# Patient Record
Sex: MALE | Race: BLACK OR AFRICAN AMERICAN | NOT HISPANIC OR LATINO | Employment: OTHER | URBAN - METROPOLITAN AREA
[De-identification: names, ages, dates, MRNs, and addresses within clinical notes are randomized per-mention and may not be internally consistent; named-entity substitution may affect disease eponyms.]

---

## 2017-02-02 ENCOUNTER — HOSPITAL ENCOUNTER (EMERGENCY)
Facility: HOSPITAL | Age: 67
Discharge: HOME | End: 2017-02-02
Attending: EMERGENCY MEDICINE
Payer: OTHER GOVERNMENT

## 2017-02-02 VITALS
TEMPERATURE: 99 F | DIASTOLIC BLOOD PRESSURE: 75 MMHG | HEART RATE: 91 BPM | WEIGHT: 164 LBS | HEIGHT: 71 IN | RESPIRATION RATE: 18 BRPM | SYSTOLIC BLOOD PRESSURE: 160 MMHG | BODY MASS INDEX: 22.96 KG/M2 | OXYGEN SATURATION: 98 %

## 2017-02-02 DIAGNOSIS — R06.02 SHORTNESS OF BREATH: Primary | ICD-10-CM

## 2017-02-02 DIAGNOSIS — I50.22 CHRONIC SYSTOLIC CONGESTIVE HEART FAILURE: ICD-10-CM

## 2017-02-02 DIAGNOSIS — I10 ESSENTIAL HYPERTENSION: ICD-10-CM

## 2017-02-02 DIAGNOSIS — J44.1 COPD EXACERBATION: ICD-10-CM

## 2017-02-02 LAB
ALBUMIN SERPL BCP-MCNC: 4.2 G/DL
ALLENS TEST: ABNORMAL
ALP SERPL-CCNC: 77 U/L
ALT SERPL W/O P-5'-P-CCNC: 25 U/L
ANION GAP SERPL CALC-SCNC: 14 MMOL/L
AST SERPL-CCNC: 38 U/L
BASOPHILS # BLD AUTO: 0.01 K/UL
BASOPHILS NFR BLD: 0.2 %
BILIRUB SERPL-MCNC: 0.5 MG/DL
BNP SERPL-MCNC: 2066 PG/ML
BUN SERPL-MCNC: 15 MG/DL
CALCIUM SERPL-MCNC: 9.5 MG/DL
CHLORIDE SERPL-SCNC: 103 MMOL/L
CK MB SERPL-MCNC: 2.9 NG/ML
CK MB SERPL-RTO: 1.1 %
CK SERPL-CCNC: 274 U/L
CK SERPL-CCNC: 274 U/L
CO2 SERPL-SCNC: 27 MMOL/L
CREAT SERPL-MCNC: 1.1 MG/DL
DELSYS: ABNORMAL
DIFFERENTIAL METHOD: ABNORMAL
EOSINOPHIL # BLD AUTO: 0.1 K/UL
EOSINOPHIL NFR BLD: 1.5 %
ERYTHROCYTE [DISTWIDTH] IN BLOOD BY AUTOMATED COUNT: 14.6 %
EST. GFR  (AFRICAN AMERICAN): >60 ML/MIN/1.73 M^2
EST. GFR  (NON AFRICAN AMERICAN): >60 ML/MIN/1.73 M^2
GLUCOSE SERPL-MCNC: 137 MG/DL
HCO3 UR-SCNC: 29.3 MMOL/L (ref 24–28)
HCT VFR BLD AUTO: 39.9 %
HGB BLD-MCNC: 12.7 G/DL
LYMPHOCYTES # BLD AUTO: 1.7 K/UL
LYMPHOCYTES NFR BLD: 37.8 %
MCH RBC QN AUTO: 28 PG
MCHC RBC AUTO-ENTMCNC: 31.8 %
MCV RBC AUTO: 88 FL
MODE: ABNORMAL
MONOCYTES # BLD AUTO: 0.2 K/UL
MONOCYTES NFR BLD: 4.8 %
NEUTROPHILS # BLD AUTO: 2.6 K/UL
NEUTROPHILS NFR BLD: 55.7 %
PCO2 BLDA: 43.3 MMHG (ref 35–45)
PH SMN: 7.44 [PH] (ref 7.35–7.45)
PLATELET # BLD AUTO: 285 K/UL
PMV BLD AUTO: 11.1 FL
PO2 BLDA: 64 MMHG (ref 80–100)
POC BE: 5 MMOL/L
POC SATURATED O2: 93 % (ref 95–100)
POTASSIUM SERPL-SCNC: 3.8 MMOL/L
PROT SERPL-MCNC: 8.6 G/DL
RBC # BLD AUTO: 4.54 M/UL
SAMPLE: ABNORMAL
SITE: ABNORMAL
SODIUM SERPL-SCNC: 144 MMOL/L
TROPONIN I SERPL DL<=0.01 NG/ML-MCNC: 0.03 NG/ML
WBC # BLD AUTO: 4.6 K/UL

## 2017-02-02 PROCEDURE — 82803 BLOOD GASES ANY COMBINATION: CPT | Performed by: GENERAL PRACTICE

## 2017-02-02 PROCEDURE — 93010 ELECTROCARDIOGRAM REPORT: CPT | Mod: ,,, | Performed by: INTERNAL MEDICINE

## 2017-02-02 PROCEDURE — 94761 N-INVAS EAR/PLS OXIMETRY MLT: CPT | Performed by: GENERAL PRACTICE

## 2017-02-02 PROCEDURE — 99900031 HC PATIENT EDUCATION (STAT): Performed by: GENERAL PRACTICE

## 2017-02-02 PROCEDURE — 94640 AIRWAY INHALATION TREATMENT: CPT | Performed by: GENERAL PRACTICE

## 2017-02-02 PROCEDURE — 99900035 HC TECH TIME PER 15 MIN (STAT): Performed by: GENERAL PRACTICE

## 2017-02-02 PROCEDURE — 82553 CREATINE MB FRACTION: CPT

## 2017-02-02 PROCEDURE — 99284 EMERGENCY DEPT VISIT MOD MDM: CPT | Mod: 25

## 2017-02-02 PROCEDURE — 80053 COMPREHEN METABOLIC PANEL: CPT

## 2017-02-02 PROCEDURE — 83880 ASSAY OF NATRIURETIC PEPTIDE: CPT

## 2017-02-02 PROCEDURE — 36600 WITHDRAWAL OF ARTERIAL BLOOD: CPT | Performed by: GENERAL PRACTICE

## 2017-02-02 PROCEDURE — 84484 ASSAY OF TROPONIN QUANT: CPT

## 2017-02-02 PROCEDURE — 93005 ELECTROCARDIOGRAM TRACING: CPT

## 2017-02-02 PROCEDURE — 63600175 PHARM REV CODE 636 W HCPCS: Performed by: EMERGENCY MEDICINE

## 2017-02-02 PROCEDURE — 96374 THER/PROPH/DIAG INJ IV PUSH: CPT

## 2017-02-02 PROCEDURE — 85025 COMPLETE CBC W/AUTO DIFF WBC: CPT

## 2017-02-02 PROCEDURE — 93005 ELECTROCARDIOGRAM TRACING: CPT | Performed by: GENERAL PRACTICE

## 2017-02-02 PROCEDURE — 27000221 HC OXYGEN, UP TO 24 HOURS: Performed by: GENERAL PRACTICE

## 2017-02-02 PROCEDURE — 99900029 HC O2 SETUP (STAT): Performed by: GENERAL PRACTICE

## 2017-02-02 PROCEDURE — 25000242 PHARM REV CODE 250 ALT 637 W/ HCPCS: Performed by: EMERGENCY MEDICINE

## 2017-02-02 RX ORDER — METHYLPREDNISOLONE 4 MG/1
TABLET ORAL
Qty: 1 PACKAGE | Refills: 0 | Status: SHIPPED | OUTPATIENT
Start: 2017-02-02

## 2017-02-02 RX ORDER — METHYLPREDNISOLONE SOD SUCC 125 MG
125 VIAL (EA) INJECTION
Status: COMPLETED | OUTPATIENT
Start: 2017-02-02 | End: 2017-02-02

## 2017-02-02 RX ORDER — IPRATROPIUM BROMIDE AND ALBUTEROL SULFATE 2.5; .5 MG/3ML; MG/3ML
3 SOLUTION RESPIRATORY (INHALATION)
Status: COMPLETED | OUTPATIENT
Start: 2017-02-02 | End: 2017-02-02

## 2017-02-02 RX ADMIN — METHYLPREDNISOLONE SODIUM SUCCINATE 125 MG: 125 INJECTION, POWDER, FOR SOLUTION INTRAMUSCULAR; INTRAVENOUS at 12:02

## 2017-02-02 RX ADMIN — IPRATROPIUM BROMIDE AND ALBUTEROL SULFATE 3 ML: .5; 3 SOLUTION RESPIRATORY (INHALATION) at 12:02

## 2017-02-02 NOTE — ED NOTES
Pt resting on stretcher. Denies any needs @ present time. Call bell in reach.  Encouraged to call for any needs and/or assistance. Verbalizes understanding. Bed in lowest position and locked.

## 2017-02-02 NOTE — ED PROVIDER NOTES
Encounter Date: 2/2/2017       History     Chief Complaint   Patient presents with    Shortness of Breath     Hx of CHF. States began getting short of breath when lying down starting last night. No respiratory distress noted.      Review of patient's allergies indicates:  No Known Allergies  HPI Comments: He has a history of CHF, coronary disease, and COPD.  [facility]  Reports good compliance with medications, home nebulizers, and home inhalers.  Wife confirms that he is compliant.  Not on home oxygen.  Has had similar episodes in the past, has not required admission.  Feels short of breath with mild orthopnea, mild dyspnea on exertion, and mild cough for the last day or two.  No fever, chills, sweats, sputum production, chest pain, lower extremity edema, pleurisy, hemoptysis, or other complaints.    The history is provided by the patient and the spouse. No  was used.     Past Medical History   Diagnosis Date    Benign prostatic hypertrophy     CHF (congestive heart failure)     COPD (chronic obstructive pulmonary disease)     Coronary artery disease     Depression     Hyperlipidemia     Hypertension      No past medical history pertinent negatives.  Past Surgical History   Procedure Laterality Date    Cardiac surgery      Carotid artery surgery       History reviewed. No pertinent family history.  Social History   Substance Use Topics    Smoking status: Former Smoker    Smokeless tobacco: Never Used    Alcohol use No     Review of Systems   Constitutional: Negative for chills and fever.   HENT: Negative for congestion, facial swelling, nosebleeds and sinus pressure.    Eyes: Negative for pain and redness.   Respiratory: Positive for shortness of breath and wheezing. Negative for chest tightness.    Cardiovascular: Negative for chest pain, palpitations and leg swelling.   Gastrointestinal: Negative for abdominal distention, abdominal pain, diarrhea, nausea and vomiting.    Endocrine: Negative for cold intolerance, polydipsia and polyphagia.   Genitourinary: Negative for difficulty urinating, dysuria, frequency and hematuria.   Musculoskeletal: Negative for arthralgias, back pain, myalgias and neck pain.   Skin: Negative for color change and rash.   Neurological: Negative for dizziness, weakness, numbness and headaches.   Hematological: Negative for adenopathy. Does not bruise/bleed easily.   Psychiatric/Behavioral: Negative for agitation and behavioral problems.   All other systems reviewed and are negative.      Physical Exam   Initial Vitals   BP Pulse Resp Temp SpO2   02/02/17 1114 02/02/17 1114 02/02/17 1114 02/02/17 1114 02/02/17 1114   185/81 93 20 98.6 °F (37 °C) 96 %     Physical Exam    Nursing note and vitals reviewed.  Constitutional: He appears well-developed and well-nourished. He is not diaphoretic. No distress.   HENT:   Head: Normocephalic and atraumatic.   Mouth/Throat: Oropharynx is clear and moist. No oropharyngeal exudate.   Eyes: Conjunctivae and EOM are normal. Pupils are equal, round, and reactive to light. Right eye exhibits no discharge. Left eye exhibits no discharge. No scleral icterus.   Neck: Normal range of motion. Neck supple. No thyromegaly present. No tracheal deviation present. No JVD present.   Cardiovascular: Normal rate, regular rhythm and normal heart sounds. Exam reveals no gallop and no friction rub.    No murmur heard.  Pulmonary/Chest: No respiratory distress. He has wheezes. He has no rhonchi. He has no rales. He exhibits no tenderness.   Moderate diffuse expiratory wheezing with mildly prolonged expiratory phase.  No rales   Abdominal: Soft. Bowel sounds are normal. He exhibits no distension and no mass. There is no tenderness. There is no rebound and no guarding.   Musculoskeletal: Normal range of motion. He exhibits no edema or tenderness.   Lymphadenopathy:     He has no cervical adenopathy.   Neurological: He is alert and oriented to  person, place, and time. He has normal strength. No cranial nerve deficit.   Skin: Skin is warm and dry. No rash noted. No erythema.   Psychiatric: He has a normal mood and affect. His behavior is normal. Judgment and thought content normal.         ED Course   Procedures  Labs Reviewed   CBC W/ AUTO DIFFERENTIAL - Abnormal; Notable for the following:        Result Value    RBC 4.54 (*)     Hemoglobin 12.7 (*)     Hematocrit 39.9 (*)     MCHC 31.8 (*)     RDW 14.6 (*)     Mono # 0.2 (*)     All other components within normal limits   COMPREHENSIVE METABOLIC PANEL - Abnormal; Notable for the following:     Glucose 137 (*)     Total Protein 8.6 (*)     All other components within normal limits   B-TYPE NATRIURETIC PEPTIDE - Abnormal; Notable for the following:     BNP 2066 (*)     All other components within normal limits   TROPONIN I - Abnormal; Notable for the following:     Troponin I 0.031 (*)     All other components within normal limits   CK - Abnormal; Notable for the following:      (*)     All other components within normal limits   CK-MB - Abnormal; Notable for the following:      (*)     All other components within normal limits   ISTAT PROCEDURE - Abnormal; Notable for the following:     POC PO2 64 (*)     POC HCO3 29.3 (*)     POC SATURATED O2 93 (*)     All other components within normal limits     EKG Readings: (Independently Interpreted)   Initial Reading: No STEMI. Rhythm: Normal Sinus Rhythm. Heart Rate: 94.   LVH with repolarization changes; old IMI; long QT       Imaging Results         X-Ray Chest PA And Lateral (Final result) Result time:  02/02/17 13:38:41    Final result by Cheo Power MD (02/02/17 13:38:41)    Impression:         No acute cardiopulmonary disease      Electronically signed by: CHEO POWER MD  Date:     02/02/17  Time:    13:38     Narrative:    Exam: Two-view chest xray    History:    Asthma    Findings:     The heart is normal and the lungs are clear.  Aortic  atherosclerosis.  Sternotomy wires.  Healing right rib fracture.                   Medical Decision Making:   History:   I obtained history from: someone other than patient.  Clinical Tests:   Lab Tests: Ordered and Reviewed  Radiological Study: Ordered and Reviewed  Medical Tests: Ordered and Reviewed    1:44 PM Feels better. Moderate elevation of BP - reports just received instructions from primary care to increase Lasix and will be doing so. Has all other meds/ supplies at home. Decreased wheezing. Feels ready to go home. Current elevations of BNP/ trop are similar to known baseline. Stable for d/c home and continued outpatient rx. Counseled patient and wife in detail.                   ED Course     Clinical Impression:     1. Shortness of breath    2. COPD exacerbation    3. Chronic systolic congestive heart failure    4. Essential hypertension                Fortunato Garcia MD  02/02/17 7339

## 2017-02-02 NOTE — ED AVS SNAPSHOT
[facility]  [address] [address] [address]               Giovanni Mcintosh   2017 11:16 AM   ED    Description:  Male : 1950   Department:  [facility]           Your Care was Coordinated By:     Provider Role From To    Fortunato Garcia MD Attending Provider 17 1103 --      Reason for Visit     Shortness of Breath           Diagnoses this Visit        Comments    Shortness of breath    -  Primary     COPD exacerbation         Chronic systolic congestive heart failure         Essential hypertension           ED Disposition     ED Disposition Condition Comment    Discharge             To Do List           Follow-up Information     Schedule an appointment as soon as possible for a visit with Cande Robbins MD.    Specialty:  Internal Medicine    Contact information:    [address]  [place] [id number] [phone]          Follow up with [facility].    Specialty:  Emergency Medicine    Why:  As needed, If symptoms worsen - As discussed - tests are stable; increase your [facility] instructions; take the steroid pack as labeled; return if worse.    Contact information:    [address] [address] [address] [phone]       These Medications        Disp Refills Start End    methylPREDNISolone (MEDROL DOSEPACK) 4 mg tablet 1 Package 0 2017     Take as labeled      [facility]     [facility] Care [facility] -  Assistance  Registered nurses in the [facility] provide clinical advisement, health education, appointment booking, and other advisory services.  Call for this free service at [phone].             Medications           Message regarding Medications     Verify the changes and/or additions to your medication regime listed below are the same as discussed with your clinician today.  If any of these changes or additions are incorrect, please  notify your healthcare provider.        START taking these NEW medications        Refills    methylPREDNISolone (MEDROL DOSEPACK) 4 mg tablet 0    Sig: Take as labeled    Class: Print      These medications were administered today        Dose Freq    albuterol-ipratropium 2.5mg-0.5mg/3mL nebulizer solution 3 mL 3 mL Every 5 min    Sig: Take 3 mLs by nebulization every 5 (five) minutes.    Class: Normal    Route: Nebulization    methylPREDNISolone sodium succinate injection 125 mg 125 mg ED 1 Time    Sig: Inject 125 mg into the vein ED 1 Time.    Class: Normal    Route: Intravenous           Verify that the below list of medications is an accurate representation of the medications you are currently taking.  If none reported, the list may be blank. If incorrect, please contact your healthcare provider. Carry this list with you in case of emergency.           Current Medications     albuterol (PROAIR HFA) 90 mcg/actuation inhaler Inhale 2 puffs into the lungs every 4 to 6 hours as needed for Wheezing.    aspirin (ECOTRIN) 325 MG EC tablet Take 325 mg by mouth once daily.    calcium-vitamin D (OSCAL) 250 (625)-125 mg-unit per tablet Take 2 tablets by mouth 2 (two) times daily.     clotrimazole (LOTRIMIN) 1 % cream Apply to affected area 2 times daily    cyproheptadine (PERIACTIN) 4 mg tablet Take 4 mg by mouth 2 (two) times daily.    docusate sodium (COLACE) 100 MG capsule Take 1 capsule (100 mg total) by mouth 2 (two) times daily as needed for Constipation (use while taking pain meds to prevent constipation).    finasteride (PROSCAR) 5 mg tablet Take 5 mg by mouth once daily.    fluoxetine (PROZAC) 20 MG capsule Take 40 mg by mouth once daily.    furosemide (LASIX) 40 MG tablet Take 1 tablet (40 mg total) by mouth once daily.    hydrocodone-acetaminophen 7.5-325mg (NORCO) 7.5-325 mg per tablet Take 1 tablet by mouth every 6 (six) hours as needed for Pain.    ibuprofen (ADVIL,MOTRIN) 600 MG tablet Take 1 tablet (600 mg  "total) by mouth every 6 (six) hours as needed for Pain.    lidocaine (LIDODERM) 5 % Place 1 patch onto the skin every 12 (twelve) hours. Remove & Discard patch within 12 hours or as directed by MD    methylPREDNISolone (MEDROL DOSEPACK) 4 mg tablet Take as labeled    mometasone 220 mcg (30 doses) inhaler Inhale 1 puff into the lungs 2 (two) times daily.    multivitamin with minerals tablet Take 1 tablet by mouth once daily.    tiotropium (SPIRIVA) 18 mcg inhalation capsule Inhale 18 mcg into the lungs once daily.           Clinical Reference Information           Your Vitals Were     BP Pulse Temp Resp Height Weight    163/76 100 98.6 °F (37 °C) (Oral) 20 5' 11" (1.803 m) 74.4 kg (164 lb)    SpO2 BMI             97% 22.87 kg/m2         Allergies as of 2/2/2017     No Known Allergies      Immunizations Administered on Date of Encounter - 2/2/2017     None      ED Micro, Lab, POCT     Start Ordered       Status Ordering Provider    02/02/17 1226 02/02/17 1226  ISTAT PROCEDURE  Once      Final result     02/02/17 1214 02/02/17 1213  CK  STAT      Final result     02/02/17 1214 02/02/17 1213  CK-MB  STAT      Final result     02/02/17 1213 02/02/17 1213  CBC auto differential  STAT      Final result     02/02/17 1213 02/02/17 1213  Comprehensive metabolic panel  STAT      Final result     02/02/17 1213 02/02/17 1213  B-Type natriuretic peptide (BNP)  STAT      Final result     02/02/17 1213 02/02/17 1213  Troponin I  STAT      Final result       ED Imaging Orders     Start Ordered       Status Ordering Provider    02/02/17 1213 02/02/17 1213  X-Ray Chest PA And Lateral  1 time imaging      Final result         Discharge Instructions         Discharge Instructions: COPD  You have been diagnosed with chronic obstructive pulmonary disease (COPD). This is a name given to a group of diseases that limit the flow of air in and out of your lungs. This makes it harder to breathe. With COPD, you are also more likely to get lung " infections. COPD includes chronic bronchitis and emphysema. COPD is most often caused by heavy, long-term cigarette smoking.  Home care  Quit smoking  · If you smoke, quit. It is the best thing you can do for your COPD and your overall health.  · Join a stop-smoking program. There are even telephone, text message, and Internet programs to help you quit.  · Ask your healthcare provider about medicines or other methods to help you quit.  · Ask family members to quit smoking as well.  · Don't allow people to smoke in your home, in your car, or when they are around you.  Protect yourself from infection  · Wash your hands often. Do your best to keep your hands away from your face. Most germs are spread from your hands to your mouth.  · Get a flu shot every year. Also ask your provider about pneumonia vaccines.  · Avoid crowds. It's especially important to do this in the winter when more people have colds and flu.  · To stay healthy, get enough sleep, exercise regularly, and eat a balanced diet. You should:  ¨ Get about 8 hours of sleep every night.  ¨ Try to exercise for at least 30 minutes on most days.  ¨ Have healthy foods including fruits and vegetables, 100% whole grains, lean meats and fish, and low-fat dairy products. Try to stay away from foods high in fats and sugar.  Take your medicines  Take your medicines exactly as directed. Don't skip doses.  Manage your stress  Stress can make COPD worse. Use this stress management technique:  · Find a quiet place and sit or lie in a comfortable position.  · Close your eyes and perform breathing exercises for several minutes. Ask your provider about the best way to breathe.  Pulmonary rehabilitation  · Pulmonary rehab can help you feel better. These programs include exercise, breathing techniques, information about COPD, counseling, and help for smokers.  · Ask your provider or your local hospital about programs in your area.  When to call your healthcare provider  Call  your provider immediately if you have any of the following:  · Shortness of breath, wheezing, or coughing  · Increased mucus  · Yellow, green, bloody, or smelly mucus  · Fever or chills  · Tightness in your chest that does not go away with rest or medicine  · An irregular heartbeat or a feeling that your heart is beating very fast  · Swollen ankles   © 3548-6065 [employer]. [address] [place] [address], [place] [address]. All rights reserved. This information is not intended as a substitute for professional medical care. Always follow your healthcare professional's instructions.          Discharge References/Attachments     HYPERTENSION, ESTABLISHED (ENGLISH)    CONGESTIVE HEART FAILURE, LEFT-SIDED (ENGLISH)      [facility] Sign-Up     Activating your [facility] account is as easy as 1-2-3!     1) Visit [email or web address], select Sign Up Now, enter this activation code and your date of birth, then select Next.  [email or web address]  Expires: 3/19/2017  1:48 PM      2) Create a username and password to use when you visit [facility] in the future and select a security question in case you lose your password and select Next.    3) Enter your e-mail address and click Sign Up!    Additional Information  If you have questions, please e-mail [email or web address] or call [phone] to talk to our [facility] staff. Remember, [facility] is NOT to be used for urgent needs. For medical emergencies, dial 911.         Smoking Cessation     If you would like to quit smoking:   You may be eligible for free services if you are a [place] resident and started smoking cigarettes before September 1, 1988.  Call the Smoking Cessation Trust (SCT) toll free at (566) 132-3799 or (719) 739-7996.   Call 2-800-QUIT-NOW if you do not meet the above criteria.             [email or web address] complies with applicable Federal civil rights laws and does not discriminate on the basis of race, color, national origin, age, disability, or  sex.        Language Assistance Services     ATTENTION: Language assistance services are available, free of charge. Please call [phone].      ATENCIÓN: Si habla español, tiene a garcia disposición servicios gratuitos de asistencia lingüística. Llame al [phone].     CHÚ Ý: N?u b?n nói Ti?ng Vi?t, có các d?ch v? h? tr? ngôn ng? mi?n phí dành cho b?n. G?i s? [phone].

## 2017-02-02 NOTE — DISCHARGE INSTRUCTIONS
Discharge Instructions: COPD  You have been diagnosed with chronic obstructive pulmonary disease (COPD). This is a name given to a group of diseases that limit the flow of air in and out of your lungs. This makes it harder to breathe. With COPD, you are also more likely to get lung infections. COPD includes chronic bronchitis and emphysema. COPD is most often caused by heavy, long-term cigarette smoking.  Home care  Quit smoking  · If you smoke, quit. It is the best thing you can do for your COPD and your overall health.  · Join a stop-smoking program. There are even telephone, text message, and Internet programs to help you quit.  · Ask your healthcare provider about medicines or other methods to help you quit.  · Ask family members to quit smoking as well.  · Don't allow people to smoke in your home, in your car, or when they are around you.  Protect yourself from infection  · Wash your hands often. Do your best to keep your hands away from your face. Most germs are spread from your hands to your mouth.  · Get a flu shot every year. Also ask your provider about pneumonia vaccines.  · Avoid crowds. It's especially important to do this in the winter when more people have colds and flu.  · To stay healthy, get enough sleep, exercise regularly, and eat a balanced diet. You should:  ¨ Get about 8 hours of sleep every night.  ¨ Try to exercise for at least 30 minutes on most days.  ¨ Have healthy foods including fruits and vegetables, 100% whole grains, lean meats and fish, and low-fat dairy products. Try to stay away from foods high in fats and sugar.  Take your medicines  Take your medicines exactly as directed. Don't skip doses.  Manage your stress  Stress can make COPD worse. Use this stress management technique:  · Find a quiet place and sit or lie in a comfortable position.  · Close your eyes and perform breathing exercises for several minutes. Ask your provider about the best way to breathe.  Pulmonary  rehabilitation  · Pulmonary rehab can help you feel better. These programs include exercise, breathing techniques, information about COPD, counseling, and help for smokers.  · Ask your provider or your local hospital about programs in your area.  When to call your healthcare provider  Call your provider immediately if you have any of the following:  · Shortness of breath, wheezing, or coughing  · Increased mucus  · Yellow, green, bloody, or smelly mucus  · Fever or chills  · Tightness in your chest that does not go away with rest or medicine  · An irregular heartbeat or a feeling that your heart is beating very fast  · Swollen ankles   © 9982-2250 [employer]. [address] [place] [address], [place] [address]. All rights reserved. This information is not intended as a substitute for professional medical care. Always follow your healthcare professional's instructions.

## 2017-02-02 NOTE — ED NOTES
Hx of CHF. States began getting short of breath when lying down starting last night. No respiratory distress noted.    Respiratory: Respirations even and unlabored. Breath sounds noted with mild expiratory wheezing in upper and lower right and left lung fields. Chest rise symmetrical.   Cardiac: Denies any chest pain.   Neuro: Alert. Oriented to person, place, time, and situation. Answering questions appropriately. PERRLA. Speech clear.   HEENT: No complaints.   Gastro: Abdomen soft. Denies any nausea, vomiting, or diarrhea. Bowel sounds active.   Genitourinary: Voids without any difficulty.   OB/GYN: N/A  Musculoskeletal: Ambulatory without any difficulty. Full active ROM.   Skin: Warm and dry. No open wounds, cuts, or scrapes.   Peripheral Vascular: Radial and pedal pulses strong, regular, and palpable.   Psychosocial: WNL    Pt informed of plan of care. Verbalizes understanding. Denies any questions. Denies any complaints or concerns at this time. Bed in lowest position and locked. Call bell in reach.

## 2017-02-24 ENCOUNTER — HOSPITAL ENCOUNTER (EMERGENCY)
Facility: HOSPITAL | Age: 67
Discharge: HOME | End: 2017-02-24
Attending: EMERGENCY MEDICINE
Payer: OTHER GOVERNMENT

## 2017-02-24 VITALS
DIASTOLIC BLOOD PRESSURE: 89 MMHG | HEART RATE: 99 BPM | TEMPERATURE: 99 F | HEIGHT: 71 IN | SYSTOLIC BLOOD PRESSURE: 177 MMHG | WEIGHT: 164 LBS | BODY MASS INDEX: 22.96 KG/M2 | RESPIRATION RATE: 20 BRPM | OXYGEN SATURATION: 96 %

## 2017-02-24 DIAGNOSIS — J44.1 COPD WITH EXACERBATION: Primary | ICD-10-CM

## 2017-02-24 DIAGNOSIS — I50.23 ACUTE ON CHRONIC SYSTOLIC CONGESTIVE HEART FAILURE: ICD-10-CM

## 2017-02-24 LAB
ALBUMIN SERPL BCP-MCNC: 3.7 G/DL
ALP SERPL-CCNC: 60 U/L
ALT SERPL W/O P-5'-P-CCNC: 24 U/L
ANION GAP SERPL CALC-SCNC: 11 MMOL/L
AST SERPL-CCNC: 32 U/L
BASOPHILS # BLD AUTO: 0.01 K/UL
BASOPHILS NFR BLD: 0.3 %
BILIRUB SERPL-MCNC: 0.6 MG/DL
BNP SERPL-MCNC: 1806 PG/ML
BUN SERPL-MCNC: 12 MG/DL
CALCIUM SERPL-MCNC: 9.3 MG/DL
CHLORIDE SERPL-SCNC: 106 MMOL/L
CO2 SERPL-SCNC: 24 MMOL/L
CREAT SERPL-MCNC: 0.9 MG/DL
DIFFERENTIAL METHOD: ABNORMAL
EOSINOPHIL # BLD AUTO: 0.1 K/UL
EOSINOPHIL NFR BLD: 3.4 %
ERYTHROCYTE [DISTWIDTH] IN BLOOD BY AUTOMATED COUNT: 15.2 %
EST. GFR  (AFRICAN AMERICAN): >60 ML/MIN/1.73 M^2
EST. GFR  (NON AFRICAN AMERICAN): >60 ML/MIN/1.73 M^2
GLUCOSE SERPL-MCNC: 104 MG/DL
HCT VFR BLD AUTO: 27.8 %
HCT VFR BLD AUTO: 38.4 %
HGB BLD-MCNC: 11.9 G/DL
HGB BLD-MCNC: 8.4 G/DL
LYMPHOCYTES # BLD AUTO: 1.7 K/UL
LYMPHOCYTES NFR BLD: 43.1 %
MCH RBC QN AUTO: 27.5 PG
MCHC RBC AUTO-ENTMCNC: 30.2 %
MCV RBC AUTO: 91 FL
MONOCYTES # BLD AUTO: 0.3 K/UL
MONOCYTES NFR BLD: 7.6 %
NEUTROPHILS # BLD AUTO: 1.8 K/UL
NEUTROPHILS NFR BLD: 45.6 %
OB PNL STL: NEGATIVE
PLATELET # BLD AUTO: 136 K/UL
PMV BLD AUTO: 11.1 FL
POTASSIUM SERPL-SCNC: 4 MMOL/L
PROT SERPL-MCNC: 7.4 G/DL
RBC # BLD AUTO: 3.06 M/UL
SODIUM SERPL-SCNC: 141 MMOL/L
TROPONIN I SERPL DL<=0.01 NG/ML-MCNC: 0.05 NG/ML
WBC # BLD AUTO: 3.83 K/UL

## 2017-02-24 PROCEDURE — 83880 ASSAY OF NATRIURETIC PEPTIDE: CPT

## 2017-02-24 PROCEDURE — 80053 COMPREHEN METABOLIC PANEL: CPT

## 2017-02-24 PROCEDURE — 94640 AIRWAY INHALATION TREATMENT: CPT

## 2017-02-24 PROCEDURE — 99284 EMERGENCY DEPT VISIT MOD MDM: CPT | Mod: 25

## 2017-02-24 PROCEDURE — 85014 HEMATOCRIT: CPT

## 2017-02-24 PROCEDURE — 96374 THER/PROPH/DIAG INJ IV PUSH: CPT

## 2017-02-24 PROCEDURE — 84484 ASSAY OF TROPONIN QUANT: CPT

## 2017-02-24 PROCEDURE — 93005 ELECTROCARDIOGRAM TRACING: CPT

## 2017-02-24 PROCEDURE — 93010 ELECTROCARDIOGRAM REPORT: CPT | Mod: ,,, | Performed by: INTERNAL MEDICINE

## 2017-02-24 PROCEDURE — 82272 OCCULT BLD FECES 1-3 TESTS: CPT

## 2017-02-24 PROCEDURE — 96375 TX/PRO/DX INJ NEW DRUG ADDON: CPT

## 2017-02-24 PROCEDURE — C9113 INJ PANTOPRAZOLE SODIUM, VIA: HCPCS | Performed by: EMERGENCY MEDICINE

## 2017-02-24 PROCEDURE — 63600175 PHARM REV CODE 636 W HCPCS: Performed by: EMERGENCY MEDICINE

## 2017-02-24 PROCEDURE — 25000242 PHARM REV CODE 250 ALT 637 W/ HCPCS: Performed by: EMERGENCY MEDICINE

## 2017-02-24 PROCEDURE — 85025 COMPLETE CBC W/AUTO DIFF WBC: CPT

## 2017-02-24 PROCEDURE — 85018 HEMOGLOBIN: CPT

## 2017-02-24 RX ORDER — FUROSEMIDE 10 MG/ML
40 INJECTION INTRAMUSCULAR; INTRAVENOUS
Status: COMPLETED | OUTPATIENT
Start: 2017-02-24 | End: 2017-02-24

## 2017-02-24 RX ORDER — IPRATROPIUM BROMIDE AND ALBUTEROL SULFATE 2.5; .5 MG/3ML; MG/3ML
3 SOLUTION RESPIRATORY (INHALATION)
Status: COMPLETED | OUTPATIENT
Start: 2017-02-24 | End: 2017-02-24

## 2017-02-24 RX ORDER — PANTOPRAZOLE SODIUM 40 MG/10ML
80 INJECTION, POWDER, LYOPHILIZED, FOR SOLUTION INTRAVENOUS
Status: COMPLETED | OUTPATIENT
Start: 2017-02-24 | End: 2017-02-24

## 2017-02-24 RX ORDER — PREDNISONE 10 MG/1
10 TABLET ORAL DAILY
Qty: 21 TABLET | Refills: 0 | Status: SHIPPED | OUTPATIENT
Start: 2017-02-24 | End: 2017-03-06

## 2017-02-24 RX ORDER — METHYLPREDNISOLONE SOD SUCC 125 MG
125 VIAL (EA) INJECTION
Status: COMPLETED | OUTPATIENT
Start: 2017-02-24 | End: 2017-02-24

## 2017-02-24 RX ADMIN — IPRATROPIUM BROMIDE AND ALBUTEROL SULFATE 3 ML: .5; 3 SOLUTION RESPIRATORY (INHALATION) at 02:02

## 2017-02-24 RX ADMIN — PANTOPRAZOLE SODIUM 80 MG: 40 INJECTION, POWDER, FOR SOLUTION INTRAVENOUS at 04:02

## 2017-02-24 RX ADMIN — METHYLPREDNISOLONE SODIUM SUCCINATE 125 MG: 125 INJECTION, POWDER, FOR SOLUTION INTRAMUSCULAR; INTRAVENOUS at 02:02

## 2017-02-24 RX ADMIN — FUROSEMIDE 40 MG: 10 INJECTION, SOLUTION INTRAMUSCULAR; INTRAVENOUS at 04:02

## 2017-02-24 NOTE — ED PROVIDER NOTES
Encounter Date: 2/24/2017       History     Chief Complaint   Patient presents with    Shortness of Breath     pt with sob for 3 days cough since today and mild headache today. hx copd     Review of patient's allergies indicates:  No Known Allergies  HPI Comments: Known to me from recent ER evaluation.  History of CHF, COPD, others as outlined.  Reports good compliance with his medications, but is apparently out of one of his inhalers or nebulizers - exactly which one is not clear.  Steroid course early this month.  Here with his usual COPD, shortness of breath with some cough and mild headache increase for the last 2 or 3 days.  No sputum production, fever, chills, sweats, chest pain, lower extremity edema, or other complaints.    The history is provided by the patient and the spouse. No  was used.     Past Medical History:   Diagnosis Date    Benign prostatic hypertrophy     CHF (congestive heart failure)     COPD (chronic obstructive pulmonary disease)     Coronary artery disease     Depression     Hyperlipidemia     Hypertension      Past Surgical History:   Procedure Laterality Date    CARDIAC SURGERY      carotid artery surgery       History reviewed. No pertinent family history.  Social History   Substance Use Topics    Smoking status: Former Smoker    Smokeless tobacco: Never Used    Alcohol use No     Review of Systems   Constitutional: Negative for chills and fever.   HENT: Negative for congestion, facial swelling, nosebleeds and sinus pressure.    Eyes: Negative for pain and redness.   Respiratory: Positive for cough and shortness of breath. Negative for chest tightness and wheezing.    Cardiovascular: Negative for chest pain, palpitations and leg swelling.   Gastrointestinal: Negative for abdominal distention, abdominal pain, diarrhea, nausea and vomiting.   Endocrine: Negative for cold intolerance, polydipsia and polyphagia.   Genitourinary: Negative for difficulty  urinating, dysuria, frequency and hematuria.   Musculoskeletal: Negative for arthralgias, back pain, myalgias and neck pain.   Skin: Negative for color change and rash.   Neurological: Negative for dizziness, weakness, numbness and headaches.   Hematological: Negative for adenopathy. Does not bruise/bleed easily.   Psychiatric/Behavioral: Negative for agitation and behavioral problems.   All other systems reviewed and are negative.      Physical Exam   Initial Vitals   BP Pulse Resp Temp SpO2   02/24/17 1324 02/24/17 1324 02/24/17 1324 02/24/17 1324 02/24/17 1324   190/91 100 20 99.3 °F (37.4 °C) 96 %     Physical Exam    Nursing note and vitals reviewed.  Constitutional: He appears well-developed and well-nourished. He is not diaphoretic. No distress.   HENT:   Head: Normocephalic and atraumatic.   Mouth/Throat: Oropharynx is clear and moist. No oropharyngeal exudate.   Eyes: Conjunctivae and EOM are normal. Pupils are equal, round, and reactive to light. Right eye exhibits no discharge. Left eye exhibits no discharge. No scleral icterus.   Neck: Normal range of motion. Neck supple. No thyromegaly present. No tracheal deviation present. No JVD present.   Cardiovascular: Normal rate, regular rhythm and normal heart sounds. Exam reveals no gallop and no friction rub.    No murmur heard.  Pulmonary/Chest: No respiratory distress. He has wheezes. He has no rhonchi. He has no rales. He exhibits no tenderness.   Mildly prolonged expiratory phase; mild diffuse wheezing; fairly good air entry.   Abdominal: Soft. Bowel sounds are normal. He exhibits no distension and no mass. There is no tenderness. There is no rebound and no guarding.   Musculoskeletal: Normal range of motion. He exhibits no edema or tenderness.   Lymphadenopathy:     He has no cervical adenopathy.   Neurological: He is alert and oriented to person, place, and time. He has normal strength. No cranial nerve deficit.   Skin: Skin is warm and dry. No rash  noted. No erythema.   Psychiatric: He has a normal mood and affect. His behavior is normal. Judgment and thought content normal.         ED Course   Procedures  Labs Reviewed   CBC W/ AUTO DIFFERENTIAL - Abnormal; Notable for the following:        Result Value    WBC 3.83 (*)     RBC 3.06 (*)     Hemoglobin 8.4 (*)     Hematocrit 27.8 (*)     MCHC 30.2 (*)     RDW 15.2 (*)     Platelets 136 (*)     All other components within normal limits   B-TYPE NATRIURETIC PEPTIDE - Abnormal; Notable for the following:     BNP 1806 (*)     All other components within normal limits   TROPONIN I - Abnormal; Notable for the following:     Troponin I 0.046 (*)     All other components within normal limits   HEMOGLOBIN - Abnormal; Notable for the following:     Hemoglobin 11.9 (*)     All other components within normal limits   HEMATOCRIT - Abnormal; Notable for the following:     Hematocrit 38.4 (*)     All other components within normal limits   COMPREHENSIVE METABOLIC PANEL   OCCULT BLOOD X 1, STOOL     Imaging Results         X-Ray Chest PA And Lateral (Final result) Result time:  02/24/17 15:32:07    Final result by Isaiah Robles MD (02/24/17 15:32:07)    Impression:      Evidence for a healing lower right lateral rib fracture and chronic compression of L2.    Evidence for mild interstitial coarsening at the bases.  This appears new when compared to the prior study concerning for an acute interstitial process such as edema.  There is a small right pleural effusion.    Please correlate clinically..      Electronically signed by: ISAIAH ROBLES MD  Date:     02/24/17  Time:    15:32     Narrative:    EXAM: Chest X-ray PA and Lateral    CLINICAL HISTORY:     COPD    COMPARISON STUDIES:     02/02/2017    FINDINGS:    Cardiomegaly.  Evidence for median sternotomy and CABG.  There is an atherosclerotic aortic arch and tortuous descending thoracic aorta.  There is biapical pleural thickening.  Slightly more prominent on the left.   Stable.  Small right pleural effusion.  Minimal bibasilar interstitial coarsening.  Healing right lower lateral rib fracture.  Chronic compression fracture of L2..            3:45 PM No known history of anemia, GI bleed, ulcer, transfusion, or other gastrointestinal problems.  Drop in H/H noted.  He thinks he may have seen some dark stool, but does not recognize any hematochezia or melena.  On rectal exam just now, soft brown normal appearing stool.  We'll recheck H&H, give empiric Protonix, and check stool for occult blood.  Also, pulmonary edema and pleural effusion noted, will give Lasix.  Resting relatively comfortably.    4:29 PM Stable, breathing easily, no complaints.  Repeat H&H is effectively normal and stool is negative.  Lab error accounts for the previous low blood count readings.  Stable for discharge with increased diuresis, discussed with patient and wife.    EKG Readings: (Independently Interpreted)   Initial Reading: No STEMI. Rhythm: Normal Sinus Rhythm. Heart Rate: 93.   LVH/ lateral TWI; long QT                            ED Course     Clinical Impression:       1. COPD with exacerbation    2. Acute on chronic systolic congestive heart failure                Fortunato Garcia MD  02/24/17 8070       Fortunato Garcia MD  02/24/17 4144

## 2017-02-24 NOTE — ED AVS SNAPSHOT
[facility]  [address] [address] [address]               Giovanni Mcintosh   2017  1:23 PM   ED    Description:  Male : 1950   Department:  [facility]           Your Care was Coordinated By:     Provider Role From To    Fortunato Garcia MD Attending Provider [phone] --      Reason for Visit     Shortness of Breath           Diagnoses this Visit        Comments    COPD with exacerbation    -  Primary     Acute on chronic systolic congestive heart failure           ED Disposition     ED Disposition Condition Comment    Discharge             To Do List           Follow-up Information     Follow up with Cande Robbins MD. Schedule an appointment as soon as possible for a visit in 3 days.    Specialty:  Internal Medicine    Contact information:    [address]  [place] [address] [phone]          Follow up with [facility].    Specialty:  Emergency Medicine    Why:  As needed, If symptoms worsen - Increase your Lasix (furosemide) to twice a day for 3 days. Continue all medicines. Get your inhaler refill. Return if worse.     Contact information:    [address] [address] [address] [phone]       These Medications        Disp Refills Start End    predniSONE (DELTASONE) 10 MG tablet 21 tablet 0 2017 3/6/2017    Take 1 tablet (10 mg total) by mouth once daily. Take 4 tabs x 3 days, then  Take 2 tabs x 3 days, then   Take 1 tab x 3 days. - Oral      [facility]     [facility] -  Assistance  Registered nurses in the [facility] provide clinical advisement, health education, appointment booking, and other advisory services.  Call for this free service at [phone].             Medications           Message regarding Medications     Verify the changes and/or additions to your medication regime listed below are the same as discussed with  your clinician today.  If any of these changes or additions are incorrect, please notify your healthcare provider.        START taking these NEW medications        Refills    predniSONE (DELTASONE) 10 MG tablet 0    Sig: Take 1 tablet (10 mg total) by mouth once daily. Take 4 tabs x 3 days, then  Take 2 tabs x 3 days, then   Take 1 tab x 3 days.    Class: Print    Route: Oral      These medications were administered today        Dose Freq    albuterol-ipratropium 2.5mg-0.5mg/3mL nebulizer solution 3 mL 3 mL Every 5 min    Sig: Take 3 mLs by nebulization every 5 (five) minutes.    Class: Normal    Route: Nebulization    methylPREDNISolone sodium succinate injection 125 mg 125 mg ED 1 Time    Sig: Inject 125 mg into the vein ED 1 Time.    Class: Normal    Route: Intravenous    pantoprazole injection 80 mg 80 mg ED 1 Time    Sig: Inject 80 mg into the vein ED 1 Time.    Class: Normal    Route: Intravenous    furosemide injection 40 mg 40 mg ED 1 Time    Sig: Inject 4 mLs (40 mg total) into the vein ED 1 Time.    Class: Normal    Route: Intravenous           Verify that the below list of medications is an accurate representation of the medications you are currently taking.  If none reported, the list may be blank. If incorrect, please contact your healthcare provider. Carry this list with you in case of emergency.           Current Medications     albuterol (PROAIR HFA) 90 mcg/actuation inhaler Inhale 2 puffs into the lungs every 4 to 6 hours as needed for Wheezing.    aspirin (ECOTRIN) 325 MG EC tablet Take 325 mg by mouth once daily.    calcium-vitamin D (OSCAL) 250 (625)-125 mg-unit per tablet Take 2 tablets by mouth 2 (two) times daily.     clotrimazole (LOTRIMIN) 1 % cream Apply to affected area 2 times daily    cyproheptadine (PERIACTIN) 4 mg tablet Take 4 mg by mouth 2 (two) times daily.    docusate sodium (COLACE) 100 MG capsule Take 1 capsule (100 mg total) by mouth 2 (two) times daily as needed for  Constipation (use while taking pain meds to prevent constipation).    finasteride (PROSCAR) 5 mg tablet Take 5 mg by mouth once daily.    fluoxetine (PROZAC) 20 MG capsule Take 40 mg by mouth once daily.    furosemide (LASIX) 40 MG tablet Take 1 tablet (40 mg total) by mouth once daily.    lidocaine (LIDODERM) 5 % Place 1 patch onto the skin every 12 (twelve) hours. Remove & Discard patch within 12 hours or as directed by MD    mometasone 220 mcg (30 doses) inhaler Inhale 1 puff into the lungs 2 (two) times daily.    multivitamin with minerals tablet Take 1 tablet by mouth once daily.    tiotropium (SPIRIVA) 18 mcg inhalation capsule Inhale 18 mcg into the lungs once daily.    furosemide injection 40 mg Inject 4 mLs (40 mg total) into the vein ED 1 Time.    hydrocodone-acetaminophen 7.5-325mg (NORCO) 7.5-325 mg per tablet Take 1 tablet by mouth every 6 (six) hours as needed for Pain.    ibuprofen (ADVIL,MOTRIN) 600 MG tablet Take 1 tablet (600 mg total) by mouth every 6 (six) hours as needed for Pain.    methylPREDNISolone (MEDROL DOSEPACK) 4 mg tablet Take as labeled    predniSONE (DELTASONE) 10 MG tablet Take 1 tablet (10 mg total) by mouth once daily. Take 4 tabs x 3 days, then  Take 2 tabs x 3 days, then   Take 1 tab x 3 days.           Clinical Reference Information           Your Vitals Were     BP                   189/91           Allergies as of 2/24/2017     No Known Allergies      Immunizations Administered on Date of Encounter - 2/24/2017     None      ED Micro, Lab, POCT     Start Ordered       Status Ordering Provider    02/24/17 1538 02/24/17 1537  Hemoglobin  STAT      Final result     02/24/17 1538 02/24/17 1537  Hematocrit  STAT      Final result     02/24/17 1537 02/24/17 1536  Occult blood x 1, stool  Once      Final result     02/24/17 1438 02/24/17 1437  CBC auto differential  STAT      Final result     02/24/17 1438 02/24/17 1437  Comprehensive metabolic panel  STAT      Final result      02/24/17 1438 02/24/17 1437  Brain natriuretic peptide  STAT      Final result     02/24/17 1438 02/24/17 1437  Troponin I  STAT      Final result     02/24/17 1356 02/24/17 1357    STAT,   Status:  Canceled      Canceled     02/24/17 1356 02/24/17 1357    STAT,   Status:  Canceled      Canceled     02/24/17 1356 02/24/17 1357    STAT,   Status:  Canceled      Canceled     02/24/17 1356 02/24/17 1357    STAT,   Status:  Canceled      Canceled       ED Imaging Orders     Start Ordered       Status Ordering Provider    02/24/17 1419 02/24/17 1418  X-Ray Chest PA And Lateral  1 time imaging      Final result         Discharge Instructions         Discharge Instructions: COPD  You have been diagnosed with chronic obstructive pulmonary disease (COPD). This is a name given to a group of diseases that limit the flow of air in and out of your lungs. This makes it harder to breathe. With COPD, you are also more likely to get lung infections. COPD includes chronic bronchitis and emphysema. COPD is most often caused by heavy, long-term cigarette smoking.  Home care  Quit smoking  · If you smoke, quit. It is the best thing you can do for your COPD and your overall health.  · Join a stop-smoking program. There are even telephone, text message, and Internet programs to help you quit.  · Ask your healthcare provider about medicines or other methods to help you quit.  · Ask family members to quit smoking as well.  · Don't allow people to smoke in your home, in your car, or when they are around you.  Protect yourself from infection  · Wash your hands often. Do your best to keep your hands away from your face. Most germs are spread from your hands to your mouth.  · Get a flu shot every year. Also ask your provider about pneumonia vaccines.  · Avoid crowds. It's especially important to do this in the winter when more people have colds and flu.  · To stay healthy, get enough sleep, exercise regularly, and eat a balanced diet. You  should:  ¨ Get about 8 hours of sleep every night.  ¨ Try to exercise for at least 30 minutes on most days.  ¨ Have healthy foods including fruits and vegetables, 100% whole grains, lean meats and fish, and low-fat dairy products. Try to stay away from foods high in fats and sugar.  Take your medicines  Take your medicines exactly as directed. Don't skip doses.  Manage your stress  Stress can make COPD worse. Use this stress management technique:  · Find a quiet place and sit or lie in a comfortable position.  · Close your eyes and perform breathing exercises for several minutes. Ask your provider about the best way to breathe.  Pulmonary rehabilitation  · Pulmonary rehab can help you feel better. These programs include exercise, breathing techniques, information about COPD, counseling, and help for smokers.  · Ask your provider or your local hospital about programs in your area.  When to call your healthcare provider  Call your provider immediately if you have any of the following:  · Shortness of breath, wheezing, or coughing  · Increased mucus  · Yellow, green, bloody, or smelly mucus  · Fever or chills  · Tightness in your chest that does not go away with rest or medicine  · An irregular heartbeat or a feeling that your heart is beating very fast  · Swollen ankles   Date Last Reviewed: 5/1/2016  © [id number] [employer]. [address] [place] [address], [place] [address]. All rights reserved. This information is not intended as a substitute for professional medical care. Always follow your healthcare professional's instructions.          Discharge References/Attachments     CONGESTIVE HEART FAILURE, LEFT-SIDED (ENGLISH)      [facility] Sign-Up     Activating your [facility] account is as easy as 1-2-3!     1) Visit [email or web address], select Sign Up Now, enter this activation code and your date of birth, then select Next.  [email or web address]  Expires: 3/19/2017  1:48 PM      2) Create a username and password to use  when you visit [facility] in the future and select a security question in case you lose your password and select Next.    3) Enter your e-mail address and click Sign Up!    Additional Information  If you have questions, please e-mail [email or web address] or call [phone] to talk to our [facility] staff. Remember, [facility] is NOT to be used for urgent needs. For medical emergencies, dial 911.         Smoking Cessation     If you would like to quit smoking:   You may be eligible for free services if you are a [place] resident and started smoking cigarettes before September 1, 1988.  Call the Smoking Cessation Trust ([facility]) toll free at (472) 389-4520 or (486) [phone].   Call 5-854-QUIT-NOW if you do not meet the above criteria.             [facility] complies with applicable Federal civil rights laws and does not discriminate on the basis of race, color, national origin, age, disability, or sex.        Language Assistance Services     ATTENTION: Language assistance services are available, free of charge. Please call [phone].      ATENCIÓN: Si habla español, tiene a garcia disposición servicios gratuitos de asistencia lingüística. Llame al [phone].     CHÚ Ý: N?u b?n nói Ti?ng Vi?t, có các d?ch v? h? tr? ngôn ng? mi?n phí dành cho b?n. G?i s? [phone].

## 2017-04-18 ENCOUNTER — HOSPITAL ENCOUNTER (OUTPATIENT)
Facility: HOSPITAL | Age: 67
Discharge: HOME | End: 2017-04-19
Attending: EMERGENCY MEDICINE | Admitting: INTERNAL MEDICINE
Payer: OTHER GOVERNMENT

## 2017-04-18 DIAGNOSIS — I50.9 CHF (CONGESTIVE HEART FAILURE): ICD-10-CM

## 2017-04-18 DIAGNOSIS — R07.9 CHEST PAIN, UNSPECIFIED: Primary | ICD-10-CM

## 2017-04-18 LAB
ALBUMIN SERPL BCP-MCNC: 3.2 G/DL
ALP SERPL-CCNC: 63 U/L
ALT SERPL W/O P-5'-P-CCNC: 26 U/L
ANION GAP SERPL CALC-SCNC: 10 MMOL/L
APTT BLDCRRT: 29.4 SEC
AST SERPL-CCNC: 37 U/L
BASOPHILS # BLD AUTO: 0 K/UL
BASOPHILS # BLD AUTO: 0.01 K/UL
BASOPHILS NFR BLD: 0 %
BASOPHILS NFR BLD: 0.3 %
BILIRUB SERPL-MCNC: 0.4 MG/DL
BNP SERPL-MCNC: 1576 PG/ML
BUN SERPL-MCNC: 16 MG/DL
CALCIUM SERPL-MCNC: 8.7 MG/DL
CHLORIDE SERPL-SCNC: 101 MMOL/L
CO2 SERPL-SCNC: 27 MMOL/L
CREAT SERPL-MCNC: 0.9 MG/DL
DIFFERENTIAL METHOD: ABNORMAL
DIFFERENTIAL METHOD: ABNORMAL
EOSINOPHIL # BLD AUTO: 0 K/UL
EOSINOPHIL # BLD AUTO: 0 K/UL
EOSINOPHIL NFR BLD: 0.6 %
EOSINOPHIL NFR BLD: 0.7 %
ERYTHROCYTE [DISTWIDTH] IN BLOOD BY AUTOMATED COUNT: 15 %
ERYTHROCYTE [DISTWIDTH] IN BLOOD BY AUTOMATED COUNT: 15.1 %
EST. GFR  (AFRICAN AMERICAN): >60 ML/MIN/1.73 M^2
EST. GFR  (NON AFRICAN AMERICAN): >60 ML/MIN/1.73 M^2
GLUCOSE SERPL-MCNC: 188 MG/DL
HCT VFR BLD AUTO: 36.6 %
HCT VFR BLD AUTO: 39.8 %
HGB BLD-MCNC: 11.3 G/DL
HGB BLD-MCNC: 12.3 G/DL
INR PPP: 1
INR PPP: 1
LYMPHOCYTES # BLD AUTO: 1.5 K/UL
LYMPHOCYTES # BLD AUTO: 2.1 K/UL
LYMPHOCYTES NFR BLD: 41.3 %
LYMPHOCYTES NFR BLD: 49.2 %
MCH RBC QN AUTO: 27.6 PG
MCH RBC QN AUTO: 27.8 PG
MCHC RBC AUTO-ENTMCNC: 30.9 %
MCHC RBC AUTO-ENTMCNC: 30.9 %
MCV RBC AUTO: 89 FL
MCV RBC AUTO: 90 FL
MONOCYTES # BLD AUTO: 0.2 K/UL
MONOCYTES # BLD AUTO: 0.4 K/UL
MONOCYTES NFR BLD: 5.3 %
MONOCYTES NFR BLD: 8.8 %
NEUTROPHILS # BLD AUTO: 1.8 K/UL
NEUTROPHILS # BLD AUTO: 1.9 K/UL
NEUTROPHILS NFR BLD: 41.1 %
NEUTROPHILS NFR BLD: 52.2 %
PLATELET # BLD AUTO: 238 K/UL
PLATELET # BLD AUTO: 262 K/UL
PLATELET # BLD AUTO: 262 K/UL
PMV BLD AUTO: 10.3 FL
PMV BLD AUTO: 10.5 FL
PMV BLD AUTO: 10.5 FL
POTASSIUM SERPL-SCNC: 3.4 MMOL/L
PROT SERPL-MCNC: 6.7 G/DL
PROTHROMBIN TIME: 10.2 SEC
PROTHROMBIN TIME: 10.6 SEC
RBC # BLD AUTO: 4.06 M/UL
RBC # BLD AUTO: 4.45 M/UL
SODIUM SERPL-SCNC: 138 MMOL/L
TROPONIN I SERPL DL<=0.01 NG/ML-MCNC: 0.02 NG/ML
TROPONIN I SERPL DL<=0.01 NG/ML-MCNC: 0.04 NG/ML
WBC # BLD AUTO: 3.56 K/UL
WBC # BLD AUTO: 4.31 K/UL

## 2017-04-18 PROCEDURE — 93005 ELECTROCARDIOGRAM TRACING: CPT

## 2017-04-18 PROCEDURE — 93010 ELECTROCARDIOGRAM REPORT: CPT | Mod: ,,, | Performed by: INTERNAL MEDICINE

## 2017-04-18 PROCEDURE — 99900035 HC TECH TIME PER 15 MIN (STAT)

## 2017-04-18 PROCEDURE — 96365 THER/PROPH/DIAG IV INF INIT: CPT

## 2017-04-18 PROCEDURE — 96375 TX/PRO/DX INJ NEW DRUG ADDON: CPT

## 2017-04-18 PROCEDURE — 83880 ASSAY OF NATRIURETIC PEPTIDE: CPT

## 2017-04-18 PROCEDURE — 85025 COMPLETE CBC W/AUTO DIFF WBC: CPT

## 2017-04-18 PROCEDURE — 25000003 PHARM REV CODE 250: Performed by: EMERGENCY MEDICINE

## 2017-04-18 PROCEDURE — 93010 ELECTROCARDIOGRAM REPORT: CPT | Mod: 77,,, | Performed by: INTERNAL MEDICINE

## 2017-04-18 PROCEDURE — 85610 PROTHROMBIN TIME: CPT | Mod: 91

## 2017-04-18 PROCEDURE — 63600175 PHARM REV CODE 636 W HCPCS: Performed by: EMERGENCY MEDICINE

## 2017-04-18 PROCEDURE — G0378 HOSPITAL OBSERVATION PER HR: HCPCS

## 2017-04-18 PROCEDURE — 84484 ASSAY OF TROPONIN QUANT: CPT

## 2017-04-18 PROCEDURE — 85730 THROMBOPLASTIN TIME PARTIAL: CPT

## 2017-04-18 PROCEDURE — 80053 COMPREHEN METABOLIC PANEL: CPT

## 2017-04-18 PROCEDURE — 99285 EMERGENCY DEPT VISIT HI MDM: CPT | Mod: 25

## 2017-04-18 PROCEDURE — 96366 THER/PROPH/DIAG IV INF ADDON: CPT

## 2017-04-18 RX ORDER — MORPHINE SULFATE 2 MG/ML
2 INJECTION, SOLUTION INTRAMUSCULAR; INTRAVENOUS
Status: COMPLETED | OUTPATIENT
Start: 2017-04-18 | End: 2017-04-18

## 2017-04-18 RX ORDER — HEPARIN SODIUM,PORCINE/D5W 25000/250
17 INTRAVENOUS SOLUTION INTRAVENOUS CONTINUOUS
Status: DISCONTINUED | OUTPATIENT
Start: 2017-04-18 | End: 2017-04-19 | Stop reason: HOSPADM

## 2017-04-18 RX ORDER — ASPIRIN 325 MG
325 TABLET ORAL
Status: COMPLETED | OUTPATIENT
Start: 2017-04-18 | End: 2017-04-18

## 2017-04-18 RX ORDER — ASPIRIN 325 MG
325 TABLET, DELAYED RELEASE (ENTERIC COATED) ORAL DAILY
Status: DISCONTINUED | OUTPATIENT
Start: 2017-04-19 | End: 2017-04-19 | Stop reason: HOSPADM

## 2017-04-18 RX ORDER — MORPHINE SULFATE 2 MG/ML
2 INJECTION, SOLUTION INTRAMUSCULAR; INTRAVENOUS EVERY 4 HOURS PRN
Status: DISCONTINUED | OUTPATIENT
Start: 2017-04-18 | End: 2017-04-19 | Stop reason: HOSPADM

## 2017-04-18 RX ORDER — ONDANSETRON 4 MG/1
4 TABLET, ORALLY DISINTEGRATING ORAL
Status: COMPLETED | OUTPATIENT
Start: 2017-04-18 | End: 2017-04-18

## 2017-04-18 RX ADMIN — HEPARIN SODIUM AND DEXTROSE 17 UNITS/KG/HR: 10000; 5 INJECTION INTRAVENOUS at 07:04

## 2017-04-18 RX ADMIN — ASPIRIN 325 MG ORAL TABLET 325 MG: 325 PILL ORAL at 02:04

## 2017-04-18 RX ADMIN — ONDANSETRON 4 MG: 4 TABLET, ORALLY DISINTEGRATING ORAL at 05:04

## 2017-04-18 RX ADMIN — MORPHINE SULFATE 2 MG: 2 INJECTION, SOLUTION INTRAMUSCULAR; INTRAVENOUS at 05:04

## 2017-04-18 NOTE — ED NOTES
Pt accepted to [facility]- per Hospital Medicine for Observation. NP reccommends getting cards involved as well. Dr. Cyr (cardiology) paged at this time.

## 2017-04-18 NOTE — ED NOTES
Bolus not started pt has to have coag.drawn prior to hanging ..dr. Vargas aware and reggie primary nurse drawing labs.

## 2017-04-18 NOTE — ED PROVIDER NOTES
Encounter Date: 4/18/2017       History     Chief Complaint   Patient presents with    Chest Pain     Risd chest radiating to R arm and neck     Review of patient's allergies indicates:  No Known Allergies  Patient is a 67 y.o. male presenting with the following complaint: chest pain. The history is provided by the patient.   Chest Pain   The current episode started several days ago. Chest pain occurs constantly. The chest pain is unchanged. At its most intense, the chest pain is at 10/10. The chest pain is currently at 10/10. The quality of the pain is described as aching and dull. The pain radiates to the right neck and right arm. Pertinent negatives for primary symptoms include no fever, no fatigue, no syncope, no shortness of breath, no cough, no wheezing, no palpitations, no abdominal pain, no nausea, no vomiting, no dizziness and no altered mental status. He tried nothing for the symptoms.   His past medical history is significant for CAD.     Past Medical History:   Diagnosis Date    Benign prostatic hypertrophy     CHF (congestive heart failure)     COPD (chronic obstructive pulmonary disease)     Coronary artery disease     Depression     Hyperlipidemia     Hypertension      Past Surgical History:   Procedure Laterality Date    CARDIAC SURGERY      carotid artery surgery       History reviewed. No pertinent family history.  Social History   Substance Use Topics    Smoking status: Former Smoker    Smokeless tobacco: Never Used    Alcohol use No     Review of Systems   Constitutional: Negative for fatigue and fever.   Respiratory: Negative for cough, shortness of breath and wheezing.    Cardiovascular: Positive for chest pain. Negative for palpitations and syncope.   Gastrointestinal: Negative for abdominal pain, nausea and vomiting.   Neurological: Negative for dizziness.   All other systems reviewed and are negative.      Physical Exam   Initial Vitals   BP Pulse Resp Temp SpO2   04/18/17 1349  04/18/17 1349 04/18/17 1349 04/18/17 1349 04/18/17 1349   154/70 79 19 99 °F (37.2 °C) 98 %     Physical Exam    Nursing note and vitals reviewed.  Constitutional: He appears well-developed and well-nourished. He is not diaphoretic. No distress.   HENT:   Head: Normocephalic and atraumatic.   Mouth/Throat: Oropharynx is clear and moist.   Eyes: EOM are normal. Pupils are equal, round, and reactive to light.   Neck: Normal range of motion. Neck supple.   Cardiovascular: Normal rate and regular rhythm.   No murmur heard.  Pulmonary/Chest: Breath sounds normal. No respiratory distress. He has no wheezes.   Abdominal: Soft. Bowel sounds are normal. He exhibits no distension. There is no tenderness.   Musculoskeletal: Normal range of motion. He exhibits no edema or tenderness.   Neurological: He is alert and oriented to person, place, and time. He has normal strength.   Skin: Skin is warm and dry.   Psychiatric: He has a normal mood and affect. Thought content normal.         ED Course   Procedures  Labs Reviewed   CBC W/ AUTO DIFFERENTIAL   COMPREHENSIVE METABOLIC PANEL   PROTIME-INR   TROPONIN I   B-TYPE NATRIURETIC PEPTIDE     Results for orders placed or performed during the hospital encounter of 04/18/17   CBC auto differential   Result Value Ref Range    WBC 3.56 (L) 3.90 - 12.70 K/uL    RBC 4.06 (L) 4.60 - 6.20 M/uL    Hemoglobin 11.3 (L) 14.0 - 18.0 g/dL    Hematocrit 36.6 (L) 40.0 - 54.0 %    MCV 90 82 - 98 fL    MCH 27.8 27.0 - 31.0 pg    MCHC 30.9 (L) 32.0 - 36.0 %    RDW 15.0 (H) 11.5 - 14.5 %    Platelets 238 150 - 350 K/uL    MPV 10.3 9.2 - 12.9 fL    Gran # 1.9 1.8 - 7.7 K/uL    Lymph # 1.5 1.0 - 4.8 K/uL    Mono # 0.2 (L) 0.3 - 1.0 K/uL    Eos # 0.0 0.0 - 0.5 K/uL    Baso # 0.01 0.00 - 0.20 K/uL    Gran% 52.2 38.0 - 73.0 %    Lymph% 41.3 18.0 - 48.0 %    Mono% 5.3 4.0 - 15.0 %    Eosinophil% 0.6 0.0 - 8.0 %    Basophil% 0.3 0.0 - 1.9 %    Differential Method Automated    Protime-INR   Result Value Ref  Range    Prothrombin Time 10.6 9.0 - 12.5 sec    INR 1.0 0.8 - 1.2   B-Type natriuretic peptide (BNP)   Result Value Ref Range    BNP 1576 (H) 0 - 99 pg/mL   Comprehensive metabolic panel   Result Value Ref Range    Sodium 138 136 - 145 mmol/L    Potassium 3.4 (L) 3.5 - 5.1 mmol/L    Chloride 101 95 - 110 mmol/L    CO2 27 23 - 29 mmol/L    Glucose 188 (H) 70 - 110 mg/dL    BUN, Bld 16 8 - 23 mg/dL    Creatinine 0.9 0.5 - 1.4 mg/dL    Calcium 8.7 8.7 - 10.5 mg/dL    Total Protein 6.7 6.0 - 8.4 g/dL    Albumin 3.2 (L) 3.5 - 5.2 g/dL    Total Bilirubin 0.4 0.1 - 1.0 mg/dL    Alkaline Phosphatase 63 55 - 135 U/L    AST 37 10 - 40 U/L    ALT 26 10 - 44 U/L    Anion Gap 10 8 - 16 mmol/L    eGFR if African American >60.0 >60 mL/min/1.73 m^2    eGFR if non African American >60.0 >60 mL/min/1.73 m^2   Troponin I   Result Value Ref Range    Troponin I 0.020 0.000 - 0.026 ng/mL   Troponin I   Result Value Ref Range    Troponin I 0.036 (H) 0.000 - 0.026 ng/mL       Imaging Results         X-Ray Chest 1 View (Final result) Result time:  04/18/17 14:26:28    Final result by JESS Wilson Sr., MD (04/18/17 14:26:28)    Impression:        1. There are healing versus healed fractures in the lateral aspect of the right eighth and ninth ribs.  2. The lungs are clear.  3. There are multiple sternotomy wires in place.      Electronically signed by: JESS WILSON MD  Date:     04/18/17  Time:    14:26     Narrative:    One-view chest x-ray    Clinical History: Chest pain    Finding: Comparison was made to a prior examination performed on 2/24/2017. There are multiple sternotomy wires in place. The size of the heart is normal. The lungs are clear. There is no pneumothorax.  The costophrenic angles are sharp. There are healing versus healed fractures in the lateral aspect of the right eighth and ninth ribs.              EKG Readings: (Independently Interpreted)   Initial Reading: No STEMI. Rhythm: Normal Sinus Rhythm. Heart Rate: 85.  Ectopy: No Ectopy. ST Segments: Non-Specific ST Segment Depression. T Waves Flipped: V3, V4 and V5. Axis: Left Axis Deviation. Clinical Impression: Normal Sinus Rhythm   5:45 PM Discussed labs/ need for further Cardiac evaluation and admission to [facility] secondary to not having inpatient rooms at this facility. Pt verbalizes understanding of need for transfer via [facility] for cardiac monitoring en route.   5:52 PM Discussed case with Jayda PICHARDO, [facility] Cardiology consult accepts pt for Dr Barriga OBS  5:55 PM  Cardiology consult Discussed case with DR Cyr, recs Heparin drip overnight                        ED Course     Clinical Impression:   The encounter diagnosis was Chest pain, unspecified.    Disposition:   Disposition: Placed in Observation  Condition: Stable       Kermit Vargas MD  04/18/17 [id number]

## 2017-04-18 NOTE — IP AVS SNAPSHOT
[address] [place] [address]           Patient Discharge Instructions   Our goal is to set you up for success. This packet includes information on your condition, medications, and your home care.  It will help you care for yourself to prevent having to return to the hospital.     Please ask your nurse if you have any questions.      There are many details to remember when preparing to leave the hospital. Here is what you will need to do:    1. Take your medicine. If you are prescribed medications, review your Medication List on the following pages. You may have new medications to  at the pharmacy and others that you'll need to stop taking. Review the instructions for how and when to take your medications. Talk with your doctor or nurses if you are unsure of what to do.     2. Go to your follow-up appointments. Specific follow-up information is listed in the following pages. Your may be contacted by a nurse or clinical provider about future appointments. Be sure we have all of the phone numbers to reach you. Please contact your provider's office if you are unable to make an appointment.     3. Watch for warning signs. Your doctor or nurse will give you detailed warning signs to watch for and when to call for assistance. These instructions may also include educational information about your condition. If you experience any of warning signs to your health, call your doctor.               ** Verify the list of medication(s) below is accurate and up to date. Carry this with you in case of emergency. If your medications have changed, please notify your healthcare provider.             Medication List      START taking these medications        Additional Info                      cyclobenzaprine 5 MG tablet   Commonly known as:  FLEXERIL   Quantity:  30 tablet   Refills:  0   Dose:  5 mg    Instructions:  Take 1 tablet (5 mg total) by mouth 3 (three) times daily as  needed for Muscle spasms.     Begin Date    AM    Noon    PM    Bedtime       meloxicam 7.5 MG tablet   Commonly known as:  MOBIC   Quantity:  30 tablet   Refills:  0   Dose:  7.5 mg    Instructions:  Take 1 tablet (7.5 mg total) by mouth once daily.     Begin Date    AM    Noon    PM    Bedtime         CHANGE how you take these medications        Additional Info                      aspirin 81 MG EC tablet   Commonly known as:  ECOTRIN   Refills:  0   Dose:  81 mg   What changed:    - medication strength  - how much to take    Last time this was given:  325 mg on 4/19/2017  9:04 AM   Instructions:  Take 1 tablet (81 mg total) by mouth once daily.     Begin Date    AM    Noon    PM    Bedtime         CONTINUE taking these medications        Additional Info                      calcium-vitamin D 250 (625)-125 mg-unit per tablet   Commonly known as:  OSCAL   Refills:  0   Dose:  2 tablet    Instructions:  Take 2 tablets by mouth 2 (two) times daily.     Begin Date    AM    Noon    PM    Bedtime       clotrimazole 1 % cream   Commonly known as:  LOTRIMIN   Quantity:  45 g   Refills:  0    Instructions:  Apply to affected area 2 times daily     Begin Date    AM    Noon    PM    Bedtime       cyproheptadine 4 mg tablet   Commonly known as:  PERIACTIN   Refills:  0   Dose:  4 mg    Instructions:  Take 4 mg by mouth 2 (two) times daily.     Begin Date    AM    Noon    PM    Bedtime       docusate sodium 100 MG capsule   Commonly known as:  COLACE   Quantity:  20 capsule   Refills:  0   Dose:  100 mg    Instructions:  Take 1 capsule (100 mg total) by mouth 2 (two) times daily as needed for Constipation (use while taking pain meds to prevent constipation).     Begin Date    AM    Noon    PM    Bedtime       finasteride 5 mg tablet   Commonly known as:  PROSCAR   Refills:  0   Dose:  5 mg   Indications:  Benign Prostatic Hyperplasia    Last time this was given:  5 mg on 4/19/2017  9:04 AM   Instructions:  Take 5 mg by mouth  once daily.     Begin Date    AM    Noon    PM    Bedtime       fluoxetine 20 MG capsule   Commonly known as:  PROZAC   Refills:  0   Dose:  40 mg   Indications:  major depressive disorder    Last time this was given:  40 mg on 4/19/2017  9:03 AM   Instructions:  Take 40 mg by mouth once daily.     Begin Date    AM    Noon    PM    Bedtime       furosemide 40 MG tablet   Commonly known as:  LASIX   Quantity:  30 tablet   Refills:  0   Dose:  40 mg    Instructions:  Take 1 tablet (40 mg total) by mouth once daily.     Begin Date    AM    Noon    PM    Bedtime       hydrocodone-acetaminophen 7.5-325mg 7.5-325 mg per tablet   Commonly known as:  NORCO   Quantity:  20 tablet   Refills:  0   Dose:  1 tablet    Instructions:  Take 1 tablet by mouth every 6 (six) hours as needed for Pain.     Begin Date    AM    Noon    PM    Bedtime       lidocaine 5 %   Commonly known as:  LIDODERM   Refills:  0   Dose:  1 patch    Instructions:  Place 1 patch onto the skin every 12 (twelve) hours. Remove & Discard patch within 12 hours or as directed by MD     Begin Date    AM    Noon    PM    Bedtime       methylPREDNISolone 4 mg tablet   Commonly known as:  MEDROL DOSEPACK   Quantity:  1 Package   Refills:  0    Instructions:  Take as labeled     Begin Date    AM    Noon    PM    Bedtime       mometasone 220 mcg (30 doses) inhaler   Refills:  0   Dose:  1 puff    Instructions:  Inhale 1 puff into the lungs 2 (two) times daily.     Begin Date    AM    Noon    PM    Bedtime       multivitamin with minerals tablet   Refills:  0   Dose:  1 tablet    Instructions:  Take 1 tablet by mouth once daily.     Begin Date    AM    Noon    PM    Bedtime       PROAIR HFA 90 mcg/actuation inhaler   Refills:  0   Dose:  2 puff   Generic drug:  albuterol    Instructions:  Inhale 2 puffs into the lungs every 4 to 6 hours as needed for Wheezing.     Begin Date    AM    Noon    PM    Bedtime       tiotropium 18 mcg inhalation capsule   Commonly known as:   SPIRIVA   Refills:  0   Dose:  18 mcg    Instructions:  Inhale 18 mcg into the lungs once daily.     Begin Date    AM    Noon    PM    Bedtime         STOP taking these medications     ibuprofen 600 MG tablet   Commonly known as:  ADVIL,MOTRIN            Where to Get Your Medications      You can get these medications from any pharmacy     Bring a paper prescription for each of these medications     cyclobenzaprine 5 MG tablet    meloxicam 7.5 MG tablet       You don't need a prescription for these medications     aspirin 81 MG EC tablet                  Please bring to all follow up appointments:    1. A copy of your discharge instructions.  2. All medicines you are currently taking in their original bottles.  3. Identification and insurance card.    Please arrive 15 minutes ahead of scheduled appointment time.    Please call 24 hours in advance if you must reschedule your appointment and/or time.        Your Scheduled Appointments     May 01, 2017 11:40 AM CDT   Established Patient Visit with MD Faith Maynardal - Cardiology ([facility] [address] [phone]              Follow-up Information     Follow up with Cande Robbins MD In 3 days.    Specialty:  Internal Medicine    Why:  hospital follow up     Contact information:    [address]  [place] [address] [phone]          Follow up with Wayne Cardenas MD In 2 weeks.    Specialties:  Cardiology, Cardiovascular Disease    Why:  hospital follow up     Contact information:    [address] [facility] [address] [phone]          Discharge Instructions     Future Orders    Activity as tolerated     Call MD for:  difficulty breathing or increased cough     Call MD for:  increased confusion or weakness     Call MD for:  persistent dizziness, light-headedness, or visual disturbances     Call MD for:  persistent nausea and vomiting or diarrhea     Call MD for:  severe persistent headache  "    Call MD for:  temperature >100.4     Diet general     Questions:    Total calories:      Fat restriction, if any:      Protein restriction, if any:      Na restriction, if any:  2gNa    Fluid restriction:  Fluid - 1500mL    Additional restrictions:          Primary Diagnosis     Your primary diagnosis was:  Chest Pain      Admission Information     Date & Time Provider Department CSN    4/18/2017  1:31 PM Vonnie Del Angel MD [facility] -  [id number]      Care Providers     Provider Role Specialty Primary office phone    Vonnie Del Angel MD Attending Provider Family Medicine [phone]    Vonnie Del Angel MD Team Attending  Family Medicine [phone]    Wanye Cardenas MD Consulting Physician  Cardiology  [phone]    Fortunato Jeff MD Consulting Physician  Internal Medicine [phone]      Your Vitals Were     BP Pulse Temp Resp Height Weight    147/75 75 98 °F (36.7 °C) 18 5' 11" (1.803 m) 73.4 kg (161 lb 13.1 oz)    SpO2 BMI             95% 22.57 kg/m2         Recent Lab Values     No lab values to display.      Pending Labs     Order Current Status    Hemoglobin A1c In process    Lipid panel In process      Allergies as of 4/19/2017     No Known Allergies      [facility]     [facility] Nurse Care Line - 24/7 Assistance  Unless otherwise directed by your provider, please contact [facility], our nurse care line that is available for 24/7 assistance.     Registered nurses in the [facility] provide clinical advisement, health education, appointment booking, and other advisory services.  Call for this free service at [phone].        Advance Directives     An advance directive is a document which, in the event you are no longer able to make decisions for yourself, tells your healthcare team what kind of treatment you do or do not want to receive, or who you would like to make those decisions for you.  If you do not currently have an advance directive, [facility] " encourages you to create one.  For more information call:  [phone]), [phone] ([phone]),  or log on to [email or web address].        Smoking Cessation     If you would like to quit smoking:   You may be eligible for free services if you are a [place] resident and started smoking cigarettes before September 1, 1988.  Call the Smoking Cessation Trust (SCT) toll free at (707) 414-2126 or (031) 868-2205.   Call 4-800-QUIT-NOW if you do not meet the above criteria.   Contact us via email: [email or web address]   View our website for more information: [email or web address]        Language Assistance Services     ATTENTION: Language assistance services are available, free of charge. Please call [phone].      ATENCIÓN: Si habla español, tiene a garcia disposición servicios gratuitos de asistencia lingüística. Llame al [phone].     CHÚ Ý: N?u b?n nói Ti?ng Vi?t, có các d?ch v? h? tr? ngôn ng? mi?n phí dành cho b?n. G?i s? [phone].        Heart Failure Education       Heart Failure: Being Active  You have a condition called heart failure. Being active doesnt mean that you have to wear yourself out. Even a little movement each day helps to strengthen your heart. If you cant get out to exercise, you can do simple stretching and strengthening exercises at home. These are good ways to keep you well-conditioned and prevent you and your heart from becoming excessively weak.    Ideas to get you started  · Add a little movement to things you do now. Walk to mail letters. Park your car at the far end of the parking lot and walk to the store. Walk up a flight of stairs instead of taking the elevator.  · Choose activities you enjoy. You might walk, swim, or ride an exercise bike. Things like gardening and washing the car count, too. Other possibilities include: washing dishes, walking the dog, walking around the mall, and doing aerobic activities with friends.  · Join a  group exercise program at a [facility] or [facility], a senior center, or a community center. Or look into a hospital cardiac rehabilitation program. Ask your doctor if you qualify.  Tips to keep you going  · Get up and get dressed each day. Go to a coffee shop and read a newspaper or go somewhere that you'll be in the presence of other active people. Youll feel more like being active.  · Make a plan. Choose one or more activities that you enjoy and that you can easily do. Then plan to do at least one each day. You might write your plan on a calendar.  · Go with a friend or a group if you like company. This can help you feel supported and stay motivated, too.  · Plan social events that you enjoy. This will keep you mentally engaged as well as physically motivated to do things you find pleasure in.  For your safety  · Talk with your healthcare provider before starting an exercise program.  · Exercise indoors when its too hot or too cold outside, or when the air quality is poor. Try walking at a shopping mall.  · Wear socks and sturdy shoes to maintain your balance and prevent falls.  · Start slowly. Do a few minutes several times a day at first. Increase your time and speed little by little.  · Stop and rest whenever you feel tired or get short of breath.  · Dont push yourself on days when you dont feel well.  Date Last Reviewed: 3/20/2016  © 3848-0823 [employer]. [address] [place] [address], [place] [id number]. All rights reserved. This information is not intended as a substitute for professional medical care. Always follow your healthcare professional's instructions.              Heart Failure: Evaluating Your Heart  You have a condition called heart failure. To evaluate your condition, your doctor will examine you, ask questions, and do some tests. Along with looking for signs of heart failure, the doctor looks for any other health problems that may have led to heart failure. The results of your evaluation will  help your doctor form a treatment plan.  Health history and physical exam  Your visit will start with a health history. Tell the doctor about any symptoms youve noticed and about all medicines you take. Then youll have a physical exam. This includes listening to your heartbeat and breathing. Youll also be checked for swelling (edema) in your legs and neck. When you have fluid buildup or fluid in the lungs, it may be called congestive heart failure.  Diagnosing heart failure     During an echocardiogram, sound waves bounce off the heart. These are converted into a picture on the screen.   The following may be done to help your doctor form a diagnosis:  · X-rays show the size and shape of your heart. These pictures can also show fluid in your lungs.  · An electrocardiogram (ECG or EKG) shows the pattern of your heartbeat. Small pads (electrodes) are placed on your chest, arms, and legs. Wires connect the pads to the ECG machine, which records your hearts electrical signals. This can give the doctor information about heart function.  · An echocardiogram uses ultrasound waves to show the structure and movement of your heart muscle. This shows how well the heart pumps. It also shows the thickness of the heart walls, and if the heart is enlarged. It is one of the most useful, non-invasive tests as it provides information about the heart's general function. This helps your doctor make treatment decisions.  · Lab tests evaluate small amounts of blood or urine for signs of problems. A BNP lab test can help diagnose and evaluate heart failure. BNP stands for B-type natriuretic peptide. The ventricles secrete more BNP when heart failure worsens. Lab tests can also provide information about metabolic dysfunction or heart dysfunction.  Your treatment plan  Based on the results of your evaluation and tests, your doctor will develop a treatment plan. This plan is designed to relieve some of your heart failure symptoms and help  make you more comfortable. Your treatment plan may include:  · Medicine to help your heart work better and improve your quality of life  · Changes in what you eat and drink to help prevent fluid from backing up in your body  · Daily monitoring of your weight and heart failure symptoms to see how well your treatment plan is working  · Exercise to help you stay healthy  · Help with quitting smoking  · Emotional and psychological support to help adjust to the changes  · Referrals to other specialists to make sure you are being treated comprehensively  Date Last Reviewed: 3/21/2016  © 4566-7649 [employer]. [address] [place] [address]. All rights reserved. This information is not intended as a substitute for professional medical care. Always follow your healthcare professional's instructions.              Heart Failure: Making Changes to Your Diet  You have a condition called heart failure. When you have heart failure, excess fluid is more likely to build up in your body because your heart isn't working well. This makes the heart work harder to pump blood. Fluid buildup causes symptoms such as shortness of breath and swelling (edema). This is often referred to as congestive heart failure or CHF. Controlling the amount of salt (sodium) you eat may help stop fluid from building up. Your doctor may also tell you to reduce the amount of fluid you drink.  Reading food labels    Your healthcare provider will tell you how much sodium you can eat each day. Read food labels to keep track. Keep in mind that certain foods are high in salt. These include canned, frozen, and processed foods. Check the amount of sodium in each serving. Watch out for high-sodium ingredients. These include MSG (monosodium glutamate), baking soda, and sodium phosphate.   Eating less salt  Give yourself time to get used to eating less salt. It may take a little while. Here are some tips to help:  · Take the saltshaker off the  table. Replace it with salt-free herb mixes and spices.  · Eat fresh or plain frozen vegetables. These have much less salt than canned vegetables.  · Choose low-sodium snacks like sodium-free pretzels, crackers, or air-popped popcorn.  · Dont add salt to your food when youre cooking. Instead, season your foods with pepper, lemon, garlic, or onion.  · When you eat out, ask that your food be cooked without added salt.  · Avoid eating fried foods as these often have a great deal of salt.  If youre told to limit fluids  You may need to limit how much fluid you have to help prevent swelling. This includes anything that is liquid at room temperature, such as ice cream and soup. If your doctor tells you to limit fluid, try these tips:  · Measure drinks in a measuring cup before you drink them. This will help you meet daily goals.  · Chill drinks to make them more refreshing.  · Suck on frozen lemon wedges to quench thirst.  · Only drink when youre thirsty.  · Chew sugarless gum or suck on hard candy to keep your mouth moist.  · Weigh yourself daily to know if your body's fluid content is rising.  My sodium goal  Your healthcare provider may give you a sodium goal to meet each day. This includes sodium found in food as well as salt that you add. My goal is to eat no more than ___________ mg of sodium per day.     When to call your doctor  Call your doctor right away if you have any symptoms of worsening heart failure. These can include:  · Sudden weight gain  · Increased swelling of your legs or ankles  · Trouble breathing when youre resting or at night  · Increase in the number of pillows you have to sleep on  · Chest pain, pressure, discomfort, or pain in the jaw, neck, or back   Date Last Reviewed: 3/21/2016  © 7897-8486 [employer]. [address] [place] [address], [place] [address]. All rights reserved. This information is not intended as a substitute for professional medical care. Always follow your  healthcare professional's instructions.              Heart Failure: Medicines to Help Your Heart    You have a condition called heart failure (also known as congestive heart failure, or CHF). Your doctor will likely prescribe medicines for heart failure and any underlying health problems you have. Most heart failure patients take one or more types of medicinen. Your healthcare provider will work to find the combination of medicines that works best for you.  Heart failure medicines  Here are the most common heart failure medicines:  · ACE inhibitors lower blood pressure and decrease strain on the heart. This makes it easier for the heart to pump. Angiotensin receptor blockers have similar effects. These are prescribed for some patients instead of ACE inhibitors.  · Beta-blockers relieve stress on the heart. They also improve symptoms. They may also improve the heart's pumping action over time.  · Diuretics (also called water pills) help rid your body of excess water. This can help rid your body of swelling (edema). Having less fluid to pump means your heart doesnt have to work as hard. Some diuretics make your body lose a mineral called potassium. Your doctor will tell you if you need to take supplements or eat more foods high in potassium.  · Digoxin helps your heart pump with more strength. This helps your heart pump more blood with each beat. So, more oxygen-rich blood travels to the rest of the body.  · Aldosterone antagonists help alter hormones and decrease strain on the heart.  · Hydralazine and nitrates are two separate medicines used together to treat heart failure. They may come in one combination pill. They lower blood pressure and decrease how hard the heart has to pump.  Medicines for related conditions  Controlling other heart problems helps keep heart failure under control, too. Depending on other heart problems you have, medicines may be prescribed to:  · Lower blood pressure  (antihypertensives).  · Lower cholesterol levels (statins).  · Prevent blood clots (anticoagulants or aspirin).  · Keep the heartbeat steady (antiarrhythmics).  Date Last Reviewed: 3/5/2016  © 9354-1604 [employer]. [address] [place] [address]. All rights reserved. This information is not intended as a substitute for professional medical care. Always follow your healthcare professional's instructions.              Heart Failure: Procedures That May Help    The heart is a muscle that pumps oxygen-rich blood to all parts of the body. When you have heart failure, the heart is not able to pump as well as it should. Blood and fluid may back up into the lungs (congestive heart failure), and some parts of the body dont get enough oxygen-rich blood to work normally. These problems lead to the symptoms of heart failure.     Certain procedures may help the heart pump better in some cases of heart failure. Some procedures are done to treat health problems that may have caused the heart failure such as coronary artery disease or heart rhythm problems. For more serious heart failure, other options are available.  Treating artery and valve problems  If you have coronary artery disease or valve disease, procedures may be done to improve blood flow. This helps the heart pump better, which can improve heart failure symptoms. First, your doctor may do a cardiac catheterization to help detect clogged blood vessels or valve damage. During this procedure, a  thin tube (catheter) in inserted into a blood vessel and guided to the heart. There a dye is injected and a special type of X-ray (angiogram) is taken of the blood vessels. Procedures to open a blocked artery or fix damaged valves can also be done using catheterization.  · Angioplasty uses a balloon-tipped instrument at the end of the catheter. The balloon is inflated to widen the narrowed artery. In many cases, a stent is expanded to further support the  narrowed artery. A stent is a metal mesh tube.  · Valve surgery repairs or replacement of faulty valves can also be done during catheterization so blood can flow properly through the chambers of the heart.  Bypass surgery is another option to help treat blocked arteries. It uses a healthy blood vessel from elsewhere in the body. The healthy blood vessel is attached above and below the blocked area so that blood can flow around the blocked artery.  Treating heart rhythm problems  A device may be placed in the chest to help a weak heart maintain a healthy, heartbeat so the heart can pump more effectively:  · Pacemaker. A pacemaker is an implanted device that regulates your heartbeat electronically. It monitors your heart's rhythm and generates a painless electric impulse that helps the heart beat in a regular rhythm. A pacemaker is programmed to meet your specific heart rhythm needs.  · Biventricular pacing/cardiac resynchronization therapy. A type of pacemaker that paces both pumping chambers of the heart at the same time to coordinate contractions and to improve the heart's function. Some people with heart failure are candidates for this therapy.  · Implantable cardioverter defibrillator. A device similar to a pacemaker that senses when the heart is beating too fast and delivers an electrical shock to convert the fast rhythm to a normal rhythm. This can be a life saving device.  In severe cases  In more serious cases of heart failure when other treatments no longer work, other options may include:  · Ventricular assist devices (VADs). These are mechanical devices used to take over the pumping function for one or both of the heart's ventricles, or pumping chambers. A VAD may be necessary when heart failure progresses to the point that medicines and other treatments no longer help. In some cases, a VAD may be used as a bridge to transplant.  · Heart transplant. This is replacing the diseased heart with a healthy one  from a donor. This is an option for a few people who are very sick. A heart transplant is very serious and not an option for all patients. Your doctor can tell you more.  Date Last Reviewed: 3/20/2016  © 5833-3802 [employer]. [address] [place] [address], [place] [address]. All rights reserved. This information is not intended as a substitute for professional medical care. Always follow your healthcare professional's instructions.              Heart Failure: Tracking Your Weight  You have a condition called heart failure. When you have heart failure, a sudden weight gain or a steady rise in weight is a warning sign that your body is retaining too much water and salt. This could mean your heart failure is getting worse. If left untreated, it can cause problems for your lungs and result in shortness of breath. Weighing yourself each day is the best way to know if youre retaining water. If your weight goes up quickly, call your doctor. You will be given instructions on how to get rid of the excess water. You will likely need medicines and to avoid salt. This will help your heart work better.  Call your doctor if you gain more than 2 pounds in 1 day, more than 5 pounds in 1 week, or whatever weight gain you were told to report by your doctor. This is often a sign of worsening heart failure and needs to be evaluated and treated. Your doctor will tell you what to do next.   Tips for weighing yourself    · Weigh yourself at the same time each morning, wearing the same clothes. Weigh yourself after urinating and before eating.  · Use the same scale each day. Make sure the numbers are easy to read. Put the scale on a flat, hard surface -- not on a rug or carpet.  · Do not stop weighing yourself. If you forget one day, weigh again the next morning.  How to use your weight chart  · Keep your weight chart near the scale. Write your weight on the chart as soon as you get off the scale.  · Fill in the month and the  start date on the chart. Then write down your weight each day. Your chart will look like this:    · If you miss a day, leave the space blank. Weigh yourself the next day and write your weight in the next space.  · Take your weight chart with you when you go to see your doctor.  Date Last Reviewed: 3/20/2016  © 5265-7218 [employer]. [address] [place] [address], [place] [address]. All rights reserved. This information is not intended as a substitute for professional medical care. Always follow your healthcare professional's instructions.              Heart Failure: Warning Signs of a Flare-Up  You have a condition called heart failure. Once you have heart failure, flare-ups can happen. Below are signs that can mean your heart failure is getting worse. If you notice any of these warning signs, call your healthcare provider.  Swelling    · Your feet, ankles, or lower legs get puffier.  · You notice skin changes on your lower legs.  · Your shoes feel too tight.  · Your clothes are tighter in the waist.  · You have trouble getting rings on or off your fingers.  Shortness of breath  · You have to breathe harder even when youre doing your normal activities or when youre resting.  · You are short of breath walking up stairs or even short distances.  · You wake up at night short of breath or coughing.  · You need to use more pillows or sit up to sleep.  · You wake up tired or restless.  Other warning signs  · You feel weaker, dizzy, or more tired.  · You have chest pain or changes in your heartbeat.  · You have a cough that wont go away.  · You cant remember things or dont feel like eating.  Tracking your weight  Gaining weight is often the first warning sign that heart failure is getting worse. Gaining even a few pounds can be a sign that your body is retaining excess water and salt. Weighing yourself each day in the morning after you urinate and before you eat, is the best way to know if you're retaining  water. Get a scale that is easy to read and make sure you wear the same clothes and use the same scale every time you weigh. Your healthcare provider will show you how to track your weight. Call your doctor if you gain more than 2 pounds in 1 day, 5 pounds in 1 week, or whatever weight gain you were told to report by your doctor. This is often a sign of worsening heart failure and needs to be evaluated and treated before it compromises your breathing. Your doctor will tell you what to do next.    Date Last Reviewed: 3/15/2016  © 6484-3995 [employer]. [address] [place] [address]. All rights reserved. This information is not intended as a substitute for professional medical care. Always follow your healthcare professional's instructions.              [facility] Sign-Up     Activating your [facility] account is as easy as 1-2-3!     1) Visit [email or web address], select Sign Up Now, enter this activation code and your date of birth, then select Next.  [email or web address]  Expires: 6/3/2017  3:58 PM      2) Create a username and password to use when you visit [facility] in the future and select a security question in case you lose your password and select Next.    3) Enter your e-mail address and click Sign Up!    Additional Information  If you have questions, please e-mail [email or web address] or call [phone] to talk to our [facility] staff. Remember, [facility] is NOT to be used for urgent needs. For medical emergencies, dial 911.          [facility]  complies with applicable Federal civil rights laws and does not discriminate on the basis of race, color, national origin, age, disability, or sex.

## 2017-04-19 VITALS
OXYGEN SATURATION: 95 % | WEIGHT: 161.81 LBS | DIASTOLIC BLOOD PRESSURE: 75 MMHG | SYSTOLIC BLOOD PRESSURE: 147 MMHG | HEART RATE: 75 BPM | HEIGHT: 71 IN | RESPIRATION RATE: 18 BRPM | TEMPERATURE: 98 F | BODY MASS INDEX: 22.65 KG/M2

## 2017-04-19 PROBLEM — J44.9 COPD (CHRONIC OBSTRUCTIVE PULMONARY DISEASE): Status: ACTIVE | Noted: 2017-04-19

## 2017-04-19 LAB
ALBUMIN SERPL BCP-MCNC: 3.2 G/DL
ALP SERPL-CCNC: 65 U/L
ALT SERPL W/O P-5'-P-CCNC: 24 U/L
ANION GAP SERPL CALC-SCNC: 11 MMOL/L
AORTIC VALVE REGURGITATION: ABNORMAL
APTT BLDCRRT: 62.3 SEC
APTT BLDCRRT: 64.8 SEC
AST SERPL-CCNC: 37 U/L
BASOPHILS # BLD AUTO: 0.01 K/UL
BASOPHILS NFR BLD: 0.3 %
BILIRUB SERPL-MCNC: 0.3 MG/DL
BUN SERPL-MCNC: 20 MG/DL
CALCIUM SERPL-MCNC: 8.9 MG/DL
CHLORIDE SERPL-SCNC: 103 MMOL/L
CHOLEST/HDLC SERPL: 3.8 {RATIO}
CO2 SERPL-SCNC: 27 MMOL/L
CREAT SERPL-MCNC: 0.9 MG/DL
DIASTOLIC DYSFUNCTION: YES
DIFFERENTIAL METHOD: ABNORMAL
EOSINOPHIL # BLD AUTO: 0.1 K/UL
EOSINOPHIL NFR BLD: 1.3 %
ERYTHROCYTE [DISTWIDTH] IN BLOOD BY AUTOMATED COUNT: 15.7 %
EST. GFR  (AFRICAN AMERICAN): >60 ML/MIN/1.73 M^2
EST. GFR  (NON AFRICAN AMERICAN): >60 ML/MIN/1.73 M^2
ESTIMATED PA SYSTOLIC PRESSURE: 31.52
GLUCOSE SERPL-MCNC: 91 MG/DL
HCT VFR BLD AUTO: 35.9 %
HDL/CHOLESTEROL RATIO: 26.1 %
HDLC SERPL-MCNC: 176 MG/DL
HDLC SERPL-MCNC: 46 MG/DL
HGB BLD-MCNC: 11.3 G/DL
LDLC SERPL CALC-MCNC: 80 MG/DL
LYMPHOCYTES # BLD AUTO: 2.4 K/UL
LYMPHOCYTES NFR BLD: 61.3 %
MAGNESIUM SERPL-MCNC: 2.2 MG/DL
MCH RBC QN AUTO: 28.3 PG
MCHC RBC AUTO-ENTMCNC: 31.5 %
MCV RBC AUTO: 90 FL
MONOCYTES # BLD AUTO: 0.3 K/UL
MONOCYTES NFR BLD: 8 %
NEUTROPHILS # BLD AUTO: 1.1 K/UL
NEUTROPHILS NFR BLD: 29.1 %
NONHDLC SERPL-MCNC: 130 MG/DL
PHOSPHATE SERPL-MCNC: 3.8 MG/DL
PLATELET # BLD AUTO: 267 K/UL
PMV BLD AUTO: 10.2 FL
POTASSIUM SERPL-SCNC: 4 MMOL/L
PROT SERPL-MCNC: 6.8 G/DL
RBC # BLD AUTO: 3.99 M/UL
RETIRED EF AND QEF - SEE NOTES: 25 (ref 55–65)
SODIUM SERPL-SCNC: 141 MMOL/L
TRIGL SERPL-MCNC: 250 MG/DL
TROPONIN I SERPL DL<=0.01 NG/ML-MCNC: 0.03 NG/ML
TROPONIN I SERPL DL<=0.01 NG/ML-MCNC: 0.03 NG/ML
WBC # BLD AUTO: 3.88 K/UL

## 2017-04-19 PROCEDURE — 94640 AIRWAY INHALATION TREATMENT: CPT

## 2017-04-19 PROCEDURE — 96376 TX/PRO/DX INJ SAME DRUG ADON: CPT

## 2017-04-19 PROCEDURE — G0378 HOSPITAL OBSERVATION PER HR: HCPCS

## 2017-04-19 PROCEDURE — 80053 COMPREHEN METABOLIC PANEL: CPT

## 2017-04-19 PROCEDURE — 93306 TTE W/DOPPLER COMPLETE: CPT

## 2017-04-19 PROCEDURE — 83735 ASSAY OF MAGNESIUM: CPT

## 2017-04-19 PROCEDURE — 84484 ASSAY OF TROPONIN QUANT: CPT | Mod: 91

## 2017-04-19 PROCEDURE — 85730 THROMBOPLASTIN TIME PARTIAL: CPT | Mod: 91

## 2017-04-19 PROCEDURE — 93306 TTE W/DOPPLER COMPLETE: CPT | Mod: 26,,, | Performed by: INTERNAL MEDICINE

## 2017-04-19 PROCEDURE — 25000003 PHARM REV CODE 250: Performed by: INTERNAL MEDICINE

## 2017-04-19 PROCEDURE — 85730 THROMBOPLASTIN TIME PARTIAL: CPT

## 2017-04-19 PROCEDURE — 25000003 PHARM REV CODE 250: Performed by: EMERGENCY MEDICINE

## 2017-04-19 PROCEDURE — 96374 THER/PROPH/DIAG INJ IV PUSH: CPT

## 2017-04-19 PROCEDURE — 85025 COMPLETE CBC W/AUTO DIFF WBC: CPT

## 2017-04-19 PROCEDURE — 63600175 PHARM REV CODE 636 W HCPCS: Performed by: INTERNAL MEDICINE

## 2017-04-19 PROCEDURE — 80061 LIPID PANEL: CPT

## 2017-04-19 PROCEDURE — 63600175 PHARM REV CODE 636 W HCPCS: Performed by: EMERGENCY MEDICINE

## 2017-04-19 PROCEDURE — 36415 COLL VENOUS BLD VENIPUNCTURE: CPT

## 2017-04-19 PROCEDURE — 84100 ASSAY OF PHOSPHORUS: CPT

## 2017-04-19 PROCEDURE — 99203 OFFICE O/P NEW LOW 30 MIN: CPT | Mod: ,,, | Performed by: INTERNAL MEDICINE

## 2017-04-19 PROCEDURE — 84484 ASSAY OF TROPONIN QUANT: CPT

## 2017-04-19 PROCEDURE — 83036 HEMOGLOBIN GLYCOSYLATED A1C: CPT

## 2017-04-19 RX ORDER — IPRATROPIUM BROMIDE 0.5 MG/2.5ML
0.5 SOLUTION RESPIRATORY (INHALATION) EVERY 6 HOURS
Status: DISCONTINUED | OUTPATIENT
Start: 2017-04-19 | End: 2017-04-19 | Stop reason: HOSPADM

## 2017-04-19 RX ORDER — CYCLOBENZAPRINE HCL 5 MG
5 TABLET ORAL 3 TIMES DAILY PRN
Qty: 30 TABLET | Refills: 0 | Status: SHIPPED | OUTPATIENT
Start: 2017-04-19 | End: 2017-04-29

## 2017-04-19 RX ORDER — GUAIFENESIN 100 MG/5ML
200 SOLUTION ORAL EVERY 4 HOURS PRN
Status: DISCONTINUED | OUTPATIENT
Start: 2017-04-19 | End: 2017-04-19 | Stop reason: HOSPADM

## 2017-04-19 RX ORDER — ALBUTEROL SULFATE 0.83 MG/ML
2.5 SOLUTION RESPIRATORY (INHALATION) EVERY 4 HOURS PRN
Status: DISCONTINUED | OUTPATIENT
Start: 2017-04-19 | End: 2017-04-19 | Stop reason: HOSPADM

## 2017-04-19 RX ORDER — DIPHENHYDRAMINE HCL 25 MG
25 CAPSULE ORAL EVERY 6 HOURS PRN
Status: DISCONTINUED | OUTPATIENT
Start: 2017-04-19 | End: 2017-04-19 | Stop reason: HOSPADM

## 2017-04-19 RX ORDER — ASPIRIN 81 MG/1
81 TABLET ORAL DAILY
Refills: 0 | COMMUNITY
Start: 2017-04-19 | End: 2018-04-19

## 2017-04-19 RX ORDER — MORPHINE SULFATE 4 MG/ML
4 INJECTION, SOLUTION INTRAMUSCULAR; INTRAVENOUS EVERY 4 HOURS PRN
Status: DISCONTINUED | OUTPATIENT
Start: 2017-04-19 | End: 2017-04-19 | Stop reason: HOSPADM

## 2017-04-19 RX ORDER — IPRATROPIUM BROMIDE AND ALBUTEROL SULFATE 2.5; .5 MG/3ML; MG/3ML
3 SOLUTION RESPIRATORY (INHALATION) EVERY 4 HOURS PRN
Status: DISCONTINUED | OUTPATIENT
Start: 2017-04-19 | End: 2017-04-19 | Stop reason: HOSPADM

## 2017-04-19 RX ORDER — ACETAMINOPHEN 325 MG/1
650 TABLET ORAL EVERY 6 HOURS PRN
Status: DISCONTINUED | OUTPATIENT
Start: 2017-04-19 | End: 2017-04-19 | Stop reason: HOSPADM

## 2017-04-19 RX ORDER — FLUOXETINE HYDROCHLORIDE 20 MG/1
40 CAPSULE ORAL DAILY
Status: DISCONTINUED | OUTPATIENT
Start: 2017-04-19 | End: 2017-04-19 | Stop reason: HOSPADM

## 2017-04-19 RX ORDER — HYDRALAZINE HYDROCHLORIDE 20 MG/ML
10 INJECTION INTRAMUSCULAR; INTRAVENOUS EVERY 6 HOURS PRN
Status: DISCONTINUED | OUTPATIENT
Start: 2017-04-19 | End: 2017-04-19 | Stop reason: HOSPADM

## 2017-04-19 RX ORDER — TIOTROPIUM BROMIDE 18 UG/1
18 CAPSULE ORAL; RESPIRATORY (INHALATION) DAILY
Status: DISCONTINUED | OUTPATIENT
Start: 2017-04-19 | End: 2017-04-19

## 2017-04-19 RX ORDER — DOCUSATE SODIUM 100 MG/1
100 CAPSULE, LIQUID FILLED ORAL 2 TIMES DAILY PRN
Status: DISCONTINUED | OUTPATIENT
Start: 2017-04-19 | End: 2017-04-19 | Stop reason: HOSPADM

## 2017-04-19 RX ORDER — FINASTERIDE 5 MG/1
5 TABLET, FILM COATED ORAL DAILY
Status: DISCONTINUED | OUTPATIENT
Start: 2017-04-19 | End: 2017-04-19 | Stop reason: HOSPADM

## 2017-04-19 RX ORDER — MELOXICAM 7.5 MG/1
7.5 TABLET ORAL DAILY
Qty: 30 TABLET | Refills: 0 | Status: SHIPPED | OUTPATIENT
Start: 2017-04-19 | End: 2017-05-19

## 2017-04-19 RX ORDER — ONDANSETRON 2 MG/ML
4 INJECTION INTRAMUSCULAR; INTRAVENOUS EVERY 8 HOURS PRN
Status: DISCONTINUED | OUTPATIENT
Start: 2017-04-19 | End: 2017-04-19 | Stop reason: HOSPADM

## 2017-04-19 RX ORDER — OXYCODONE AND ACETAMINOPHEN 7.5; 325 MG/1; MG/1
1 TABLET ORAL EVERY 4 HOURS PRN
Status: DISCONTINUED | OUTPATIENT
Start: 2017-04-19 | End: 2017-04-19 | Stop reason: HOSPADM

## 2017-04-19 RX ORDER — FAMOTIDINE 20 MG/1
20 TABLET, FILM COATED ORAL 2 TIMES DAILY
Status: DISCONTINUED | OUTPATIENT
Start: 2017-04-19 | End: 2017-04-19 | Stop reason: HOSPADM

## 2017-04-19 RX ORDER — ENOXAPARIN SODIUM 100 MG/ML
40 INJECTION SUBCUTANEOUS EVERY 24 HOURS
Status: DISCONTINUED | OUTPATIENT
Start: 2017-04-19 | End: 2017-04-19

## 2017-04-19 RX ORDER — ASPIRIN 325 MG
325 TABLET, DELAYED RELEASE (ENTERIC COATED) ORAL DAILY
Status: DISCONTINUED | OUTPATIENT
Start: 2017-04-19 | End: 2017-04-19 | Stop reason: SDUPTHER

## 2017-04-19 RX ORDER — MAG HYDROX/ALUMINUM HYD/SIMETH 200-200-20
30 SUSPENSION, ORAL (FINAL DOSE FORM) ORAL EVERY 6 HOURS PRN
Status: DISCONTINUED | OUTPATIENT
Start: 2017-04-19 | End: 2017-04-19 | Stop reason: HOSPADM

## 2017-04-19 RX ADMIN — FLUOXETINE 40 MG: 20 CAPSULE ORAL at 09:04

## 2017-04-19 RX ADMIN — NITROGLYCERIN 0.5 INCH: 20 OINTMENT TOPICAL at 05:04

## 2017-04-19 RX ADMIN — IPRATROPIUM BROMIDE 0.5 MG: 0.5 SOLUTION RESPIRATORY (INHALATION) at 02:04

## 2017-04-19 RX ADMIN — DIPHENHYDRAMINE HYDROCHLORIDE 25 MG: 25 CAPSULE ORAL at 01:04

## 2017-04-19 RX ADMIN — FINASTERIDE 5 MG: 5 TABLET, FILM COATED ORAL at 09:04

## 2017-04-19 RX ADMIN — MORPHINE SULFATE 2 MG: 2 INJECTION, SOLUTION INTRAMUSCULAR; INTRAVENOUS at 12:04

## 2017-04-19 RX ADMIN — IPRATROPIUM BROMIDE 0.5 MG: 0.5 SOLUTION RESPIRATORY (INHALATION) at 07:04

## 2017-04-19 RX ADMIN — OXYCODONE HYDROCHLORIDE AND ACETAMINOPHEN 1 TABLET: 7.5; 325 TABLET ORAL at 01:04

## 2017-04-19 RX ADMIN — ASPIRIN 325 MG: 325 TABLET, DELAYED RELEASE ORAL at 09:04

## 2017-04-19 RX ADMIN — FAMOTIDINE 20 MG: 20 TABLET ORAL at 02:04

## 2017-04-19 RX ADMIN — FAMOTIDINE 20 MG: 20 TABLET ORAL at 09:04

## 2017-04-19 RX ADMIN — OXYCODONE HYDROCHLORIDE AND ACETAMINOPHEN 1 TABLET: 7.5; 325 TABLET ORAL at 08:04

## 2017-04-19 NOTE — ASSESSMENT & PLAN NOTE
- Serial cardiac enzymes  - H/o CABG, HTN  - Aspirin 325 mg   - Currently on heparin drip  - Consider cardiology consult in AM  - Resume home medications

## 2017-04-19 NOTE — H&P
[facility] Medicine  History & Physical    Patient Name: Giovanni Mcintosh  MRN: [id number]  Admission Date: 4/18/2017  Attending Physician: Galo Diamond MD  Primary Care Provider: Cande Robbins MD         Patient information was obtained from patient and ER records.     Subjective:     Principal Problem:Chest pain, unspecified    Chief Complaint:   Chief Complaint   Patient presents with    Chest Pain     Risd chest radiating to R arm and neck        HPI: Mr. Mcintosh is a 68 y/o AA male with h/o CAD, CABG, HTN, presents to the [facility] ED c/o right sided chest pain for the past 3 days. He denies SOB, dizziness, palpitations. Initial troponin 0.020, slightly increased to 0.036. He appears comfortable , but c/o mild right chest pain.           Past Medical History:   Diagnosis Date    Benign prostatic hypertrophy     CHF (congestive heart failure)     COPD (chronic obstructive pulmonary disease)     Coronary artery disease     Depression     Hyperlipidemia     Hypertension        Past Surgical History:   Procedure Laterality Date    CARDIAC SURGERY      carotid artery surgery         Review of patient's allergies indicates:  No Known Allergies    No current facility-administered medications on file prior to encounter.      Current Outpatient Prescriptions on File Prior to Encounter   Medication Sig    albuterol (PROAIR HFA) 90 mcg/actuation inhaler Inhale 2 puffs into the lungs every 4 to 6 hours as needed for Wheezing.    aspirin (ECOTRIN) 325 MG EC tablet Take 325 mg by mouth once daily.    calcium-vitamin D (OSCAL) 250 (625)-125 mg-unit per tablet Take 2 tablets by mouth 2 (two) times daily.     clotrimazole (LOTRIMIN) 1 % cream Apply to affected area 2 times daily    cyproheptadine (PERIACTIN) 4 mg tablet Take 4 mg by mouth 2 (two) times daily.    docusate sodium (COLACE) 100 MG capsule Take 1 capsule (100 mg total) by mouth 2 (two) times daily as needed for  Constipation (use while taking pain meds to prevent constipation).    finasteride (PROSCAR) 5 mg tablet Take 5 mg by mouth once daily.    fluoxetine (PROZAC) 20 MG capsule Take 40 mg by mouth once daily.    furosemide (LASIX) 40 MG tablet Take 1 tablet (40 mg total) by mouth once daily.    hydrocodone-acetaminophen 7.5-325mg (NORCO) 7.5-325 mg per tablet Take 1 tablet by mouth every 6 (six) hours as needed for Pain.    ibuprofen (ADVIL,MOTRIN) 600 MG tablet Take 1 tablet (600 mg total) by mouth every 6 (six) hours as needed for Pain.    lidocaine (LIDODERM) 5 % Place 1 patch onto the skin every 12 (twelve) hours. Remove & Discard patch within 12 hours or as directed by MD    methylPREDNISolone (MEDROL DOSEPACK) 4 mg tablet Take as labeled    mometasone 220 mcg (30 doses) inhaler Inhale 1 puff into the lungs 2 (two) times daily.    multivitamin with minerals tablet Take 1 tablet by mouth once daily.    tiotropium (SPIRIVA) 18 mcg inhalation capsule Inhale 18 mcg into the lungs once daily.     Family History     Problem Relation (Age of Onset)    Heart disease Father        Social History Main Topics    Smoking status: Former Smoker    Smokeless tobacco: Never Used    Alcohol use No    Drug use: No    Sexual activity: Not on file     Review of Systems   Constitutional: Negative.  Negative for appetite change, fatigue and fever.   HENT: Negative.  Negative for congestion, nosebleeds and sore throat.    Eyes: Negative.  Negative for photophobia, redness and visual disturbance.   Respiratory: Negative.  Negative for cough, shortness of breath and wheezing.    Cardiovascular: Positive for chest pain. Negative for palpitations and leg swelling.   Gastrointestinal: Negative.  Negative for abdominal pain, constipation, diarrhea, nausea and vomiting.   Endocrine: Negative.  Negative for polydipsia, polyphagia and polyuria.   Genitourinary: Negative.  Negative for dysuria, flank pain, frequency and urgency.    Musculoskeletal: Negative.  Negative for arthralgias, back pain and joint swelling.   Skin: Negative.  Negative for color change, pallor and rash.   Allergic/Immunologic: Negative.  Negative for environmental allergies, food allergies and immunocompromised state.   Neurological: Negative.  Negative for dizziness, syncope, weakness, light-headedness, numbness and headaches.   Hematological: Negative.    Psychiatric/Behavioral: Negative.  Negative for confusion and hallucinations. The patient is not nervous/anxious.    All other systems reviewed and are negative.    Objective:     Vital Signs (Most Recent):  Temp: 98.1 °F (36.7 °C) (04/18/17 2253)  Pulse: 76 (04/18/17 2253)  Resp: 18 (04/18/17 2253)  BP: (!) 145/73 (04/18/17 2253)  SpO2: 97 % (04/18/17 2253) Vital Signs (24h Range):  Temp:  [98.1 °F (36.7 °C)-99 °F (37.2 °C)] 98.1 °F (36.7 °C)  Pulse:  [74-83] 76  Resp:  [14-23] 18  SpO2:  [96 %-100 %] 97 %  BP: (143-175)/(67-79) 145/73     Weight: 73.4 kg (161 lb 13.1 oz)  Body mass index is 22.57 kg/(m^2).    Physical Exam   Constitutional: He is oriented to person, place, and time. He appears well-developed and well-nourished. No distress.   HENT:   Head: Normocephalic and atraumatic.   Eyes: Conjunctivae and EOM are normal. Pupils are equal, round, and reactive to light. No scleral icterus.   Neck: Normal range of motion. Neck supple. No thyromegaly present.   Cardiovascular: Normal rate, regular rhythm and normal heart sounds.    No murmur heard.  Prior CABG scar noted with small keloids   Pulmonary/Chest: Effort normal and breath sounds normal. No respiratory distress. He has no wheezes. He exhibits no tenderness.   Abdominal: Soft. Bowel sounds are normal. There is no tenderness.   Musculoskeletal: Normal range of motion. He exhibits no edema or tenderness.   Lymphadenopathy:     He has no cervical adenopathy.   Neurological: He is alert and oriented to person, place, and time. No cranial nerve deficit. He  exhibits normal muscle tone. Coordination normal.   Skin: Skin is warm and dry. He is not diaphoretic.   Psychiatric: He has a normal mood and affect. His behavior is normal. Thought content normal.   Nursing note and vitals reviewed.       Significant Labs:   BMP:   Recent Labs  Lab 04/18/17  1446   *      K 3.4*      CO2 27   BUN 16   CREATININE 0.9   CALCIUM 8.7     CBC:   Recent Labs  Lab 04/18/17  1408 04/18/17  1820   WBC 3.56* 4.31   HGB 11.3* 12.3*   HCT 36.6* 39.8*    262  262     CMP:   Recent Labs  Lab 04/18/17  1446      K 3.4*      CO2 27   *   BUN 16   CREATININE 0.9   CALCIUM 8.7   PROT 6.7   ALBUMIN 3.2*   BILITOT 0.4   ALKPHOS 63   AST 37   ALT 26   ANIONGAP 10   EGFRNONAA >60.0     Cardiac Markers:   Recent Labs  Lab 04/18/17  1408   BNP 1576*     Lactic Acid: No results for input(s): LACTATE in the last 48 hours.  Troponin:   Recent Labs  Lab 04/18/17  1446 04/18/17  1702   TROPONINI 0.020 0.036*     Urine Studies: No results for input(s): COLORU, APPEARANCEUA, PHUR, SPECGRAV, PROTEINUA, GLUCUA, KETONESU, BILIRUBINUA, OCCULTUA, NITRITE, UROBILINOGEN, LEUKOCYTESUR, RBCUA, WBCUA, BACTERIA, SQUAMEPITHEL, HYALINECASTS in the last 48 hours.    Invalid input(s): WRIGHTSUR  All pertinent labs within the past 24 hours have been reviewed.    Significant Imaging: I have reviewed and interpreted all pertinent imaging results/findings within the past 24 hours.     Imaging Results         X-Ray Chest 1 View (Final result) Result time:  04/18/17 14:26:28    Final result by JESS Wilson Sr., MD (04/18/17 14:26:28)    Impression:        1. There are healing versus healed fractures in the lateral aspect of the right eighth and ninth ribs.  2. The lungs are clear.  3. There are multiple sternotomy wires in place.      Electronically signed by: JESS WILSON MD  Date:     04/18/17  Time:    14:26     Narrative:    One-view chest x-ray    Clinical History: Chest  pain    Finding: Comparison was made to a prior examination performed on 2/24/2017. There are multiple sternotomy wires in place. The size of the heart is normal. The lungs are clear. There is no pneumothorax.  The costophrenic angles are sharp. There are healing versus healed fractures in the lateral aspect of the right eighth and ninth ribs.                Assessment/Plan:     * Chest pain, unspecified  - Serial cardiac enzymes  - H/o CABG, HTN  - Aspirin 325 mg   - Currently on heparin drip  - Consider cardiology consult in AM  - Resume home medications      COPD (chronic obstructive pulmonary disease)  - Stable, not in exacerbation  - Spiriva.      BPH (benign prostatic hyperplasia)  - Resume flomax      VTE Risk Mitigation         Ordered     Place sequential compression device  Until discontinued      04/19/17 0041     Medium Risk of VTE  Once      04/18/17 2317     Reason for No Pharmacological VTE Prophylaxis  Once      04/18/17 2317        Galo Diamond MD  Department of Hospital Medicine   [facility]

## 2017-04-19 NOTE — ED NOTES
Initial bolus from bag not ordered by Dr. Vargas. Dr. Haque aware. OK to begin continuous infusion without giving bolus.

## 2017-04-19 NOTE — SUBJECTIVE & OBJECTIVE
Past Medical History:   Diagnosis Date    Benign prostatic hypertrophy     CHF (congestive heart failure)     COPD (chronic obstructive pulmonary disease)     Coronary artery disease     Depression     Hyperlipidemia     Hypertension        Past Surgical History:   Procedure Laterality Date    CARDIAC SURGERY      carotid artery surgery         Review of patient's allergies indicates:  No Known Allergies    No current facility-administered medications on file prior to encounter.      Current Outpatient Prescriptions on File Prior to Encounter   Medication Sig    albuterol (PROAIR HFA) 90 mcg/actuation inhaler Inhale 2 puffs into the lungs every 4 to 6 hours as needed for Wheezing.    aspirin (ECOTRIN) 325 MG EC tablet Take 325 mg by mouth once daily.    calcium-vitamin D (OSCAL) 250 (625)-125 mg-unit per tablet Take 2 tablets by mouth 2 (two) times daily.     clotrimazole (LOTRIMIN) 1 % cream Apply to affected area 2 times daily    cyproheptadine (PERIACTIN) 4 mg tablet Take 4 mg by mouth 2 (two) times daily.    docusate sodium (COLACE) 100 MG capsule Take 1 capsule (100 mg total) by mouth 2 (two) times daily as needed for Constipation (use while taking pain meds to prevent constipation).    finasteride (PROSCAR) 5 mg tablet Take 5 mg by mouth once daily.    fluoxetine (PROZAC) 20 MG capsule Take 40 mg by mouth once daily.    furosemide (LASIX) 40 MG tablet Take 1 tablet (40 mg total) by mouth once daily.    hydrocodone-acetaminophen 7.5-325mg (NORCO) 7.5-325 mg per tablet Take 1 tablet by mouth every 6 (six) hours as needed for Pain.    ibuprofen (ADVIL,MOTRIN) 600 MG tablet Take 1 tablet (600 mg total) by mouth every 6 (six) hours as needed for Pain.    lidocaine (LIDODERM) 5 % Place 1 patch onto the skin every 12 (twelve) hours. Remove & Discard patch within 12 hours or as directed by MD    methylPREDNISolone (MEDROL DOSEPACK) 4 mg tablet Take as labeled    mometasone 220 mcg (30 doses)  inhaler Inhale 1 puff into the lungs 2 (two) times daily.    multivitamin with minerals tablet Take 1 tablet by mouth once daily.    tiotropium (SPIRIVA) 18 mcg inhalation capsule Inhale 18 mcg into the lungs once daily.     Family History     Problem Relation (Age of Onset)    Heart disease Father        Social History Main Topics    Smoking status: Former Smoker    Smokeless tobacco: Never Used    Alcohol use No    Drug use: No    Sexual activity: Not on file     Review of Systems   Constitutional: Negative.  Negative for appetite change, fatigue and fever.   HENT: Negative.  Negative for congestion, nosebleeds and sore throat.    Eyes: Negative.  Negative for photophobia, redness and visual disturbance.   Respiratory: Negative.  Negative for cough, shortness of breath and wheezing.    Cardiovascular: Positive for chest pain. Negative for palpitations and leg swelling.   Gastrointestinal: Negative.  Negative for abdominal pain, constipation, diarrhea, nausea and vomiting.   Endocrine: Negative.  Negative for polydipsia, polyphagia and polyuria.   Genitourinary: Negative.  Negative for dysuria, flank pain, frequency and urgency.   Musculoskeletal: Negative.  Negative for arthralgias, back pain and joint swelling.   Skin: Negative.  Negative for color change, pallor and rash.   Allergic/Immunologic: Negative.  Negative for environmental allergies, food allergies and immunocompromised state.   Neurological: Negative.  Negative for dizziness, syncope, weakness, light-headedness, numbness and headaches.   Hematological: Negative.    Psychiatric/Behavioral: Negative.  Negative for confusion and hallucinations. The patient is not nervous/anxious.    All other systems reviewed and are negative.    Objective:     Vital Signs (Most Recent):  Temp: 98.1 °F (36.7 °C) (04/18/17 2253)  Pulse: 76 (04/18/17 2253)  Resp: 18 (04/18/17 2253)  BP: (!) 145/73 (04/18/17 2253)  SpO2: 97 % (04/18/17 2253) Vital Signs (24h  Range):  Temp:  [98.1 °F (36.7 °C)-99 °F (37.2 °C)] 98.1 °F (36.7 °C)  Pulse:  [74-83] 76  Resp:  [14-23] 18  SpO2:  [96 %-100 %] 97 %  BP: (143-175)/(67-79) 145/73     Weight: 73.4 kg (161 lb 13.1 oz)  Body mass index is 22.57 kg/(m^2).    Physical Exam   Constitutional: He is oriented to person, place, and time. He appears well-developed and well-nourished. No distress.   HENT:   Head: Normocephalic and atraumatic.   Eyes: Conjunctivae and EOM are normal. Pupils are equal, round, and reactive to light. No scleral icterus.   Neck: Normal range of motion. Neck supple. No thyromegaly present.   Cardiovascular: Normal rate, regular rhythm and normal heart sounds.    No murmur heard.  Prior CABG scar noted with small keloids   Pulmonary/Chest: Effort normal and breath sounds normal. No respiratory distress. He has no wheezes. He exhibits no tenderness.   Abdominal: Soft. Bowel sounds are normal. There is no tenderness.   Musculoskeletal: Normal range of motion. He exhibits no edema or tenderness.   Lymphadenopathy:     He has no cervical adenopathy.   Neurological: He is alert and oriented to person, place, and time. No cranial nerve deficit. He exhibits normal muscle tone. Coordination normal.   Skin: Skin is warm and dry. He is not diaphoretic.   Psychiatric: He has a normal mood and affect. His behavior is normal. Thought content normal.   Nursing note and vitals reviewed.       Significant Labs:   BMP:   Recent Labs  Lab 04/18/17  1446   *      K 3.4*      CO2 27   BUN 16   CREATININE 0.9   CALCIUM 8.7     CBC:   Recent Labs  Lab 04/18/17  1408 04/18/17  1820   WBC 3.56* 4.31   HGB 11.3* 12.3*   HCT 36.6* 39.8*    262  262     CMP:   Recent Labs  Lab 04/18/17  1446      K 3.4*      CO2 27   *   BUN 16   CREATININE 0.9   CALCIUM 8.7   PROT 6.7   ALBUMIN 3.2*   BILITOT 0.4   ALKPHOS 63   AST 37   ALT 26   ANIONGAP 10   EGFRNONAA >60.0     Cardiac Markers:   Recent  Labs  Lab 04/18/17  1408   BNP 1576*     Lactic Acid: No results for input(s): LACTATE in the last 48 hours.  Troponin:   Recent Labs  Lab 04/18/17  1446 04/18/17  1702   TROPONINI 0.020 0.036*     Urine Studies: No results for input(s): COLORU, APPEARANCEUA, PHUR, SPECGRAV, PROTEINUA, GLUCUA, KETONESU, BILIRUBINUA, OCCULTUA, NITRITE, UROBILINOGEN, LEUKOCYTESUR, RBCUA, WBCUA, BACTERIA, SQUAMEPITHEL, HYALINECASTS in the last 48 hours.    Invalid input(s): WRIGHTSUR  All pertinent labs within the past 24 hours have been reviewed.    Significant Imaging: I have reviewed and interpreted all pertinent imaging results/findings within the past 24 hours.     Imaging Results         X-Ray Chest 1 View (Final result) Result time:  04/18/17 14:26:28    Final result by JESS Wilson Sr., MD (04/18/17 14:26:28)    Impression:        1. There are healing versus healed fractures in the lateral aspect of the right eighth and ninth ribs.  2. The lungs are clear.  3. There are multiple sternotomy wires in place.      Electronically signed by: JESS WILSON MD  Date:     04/18/17  Time:    14:26     Narrative:    One-view chest x-ray    Clinical History: Chest pain    Finding: Comparison was made to a prior examination performed on 2/24/2017. There are multiple sternotomy wires in place. The size of the heart is normal. The lungs are clear. There is no pneumothorax.  The costophrenic angles are sharp. There are healing versus healed fractures in the lateral aspect of the right eighth and ninth ribs.

## 2017-04-19 NOTE — ED NOTES
"Notified patient wife of pending transfer and room number in [place]. Pt wife states, "I'm already here ([facility])". Advised report given to ELLIOT Burton  "

## 2017-04-19 NOTE — ED NOTES
Spoke with Hermann GUZMAN who reports transport is delayed another 30-45 minutes due to call volume.

## 2017-04-19 NOTE — PROGRESS NOTES
Pt arrived to floor via stretcher per [facility] x 2; awake and alert; ambulated to bed w/ 1 person assist; heparin drip infusing @ 12.4ml/hr continued upon transfer; oriented to room and call light; telemetry monitor applied; educated on hourly rounding; full assess per flowsheets

## 2017-04-19 NOTE — ED NOTES
[facility] called to say that no critical care truck available at this time but sending one from [facility] and should be here in 45 minutes. Patient notified. Verbalized understanding.

## 2017-04-19 NOTE — DISCHARGE SUMMARY
[facility] Hospital Medicine  Discharge Summary      Patient Name: Giovanni Mcintosh  MRN: [id number]  Admission Date: 4/18/2017  Hospital Length of Stay: 0 days  Discharge Date and Time: 4/19/2017  4:21 PM  Attending Physician: Dr. Vonnie Sanchez   Discharging Provider: Zainab Lake NP  Primary Care Provider: Cande Robbins MD      HPI:   Mr. Mcintosh is a 68 y/o AA male with h/o CAD, CABG, HTN, presents to the [facility] ED c/o right sided chest pain for the past 3 days. He denies SOB, dizziness, palpitations. Initial troponin 0.020, slightly increased to 0.036. He appears comfortable , but c/o mild right chest pain.      Indwelling Lines/Drains at time of discharge:   Lines/Drains/Airways          No matching active lines, drains, or airways        Hospital Course:   Pt admitted to Observation Unit for Chest Pain with Cardiology consulted.  Heparin infusion initiated and IV Lasix given.  Right chest pain reproducible and deemed to be Muscular pain.  Elevated troponin due to CHF.  Echo results showed EF 25% with diastolic dysfunction and moderate AVR.  Pt seen and examined on the date of discharge and deemed suitable for discharge to home.  Pt counseled on maintaining compliance with dietary/ fluid restrictions and prescribed medication regime.  Current medications resumed with ASA, Flexeril, and Mobic prescribed.  Pt instructed to follow up with PCP in 3 days and Cardiology within 2 weeks.      Consults:   Consults         Status Ordering Provider     Inpatient consult to Cardiology  Once     Provider:  Wayne Cardenas MD    Completed VONNIE SANCHEZ          Significant Diagnostic Studies:   Imaging Results         X-Ray Chest 1 View (Final result) Result time:  04/18/17 14:26:28    Final result by JESS Wilson Sr., MD (04/18/17 14:26:28)    Impression:        1. There are healing versus healed fractures in the lateral aspect of the right eighth and ninth ribs.  2. The lungs are clear.  3. There are  multiple sternotomy wires in place.      Electronically signed by: JESS JUÁREZ MD  Date:     04/18/17  Time:    14:26     Narrative:    One-view chest x-ray    Clinical History: Chest pain    Finding: Comparison was made to a prior examination performed on 2/24/2017. There are multiple sternotomy wires in place. The size of the heart is normal. The lungs are clear. There is no pneumothorax.  The costophrenic angles are sharp. There are healing versus healed fractures in the lateral aspect of the right eighth and ninth ribs.              Pending Diagnostic Studies:     Procedure Component Value Units Date/Time    Hemoglobin A1c [[id number]] Collected:  04/19/17 0539    Order Status:  Sent Lab Status:  In process Updated:  04/19/17 1613    Specimen:  Blood from Blood     Lipid panel [[id number]] Collected:  04/19/17 0539    Order Status:  Sent Lab Status:  In process Updated:  04/19/17 1612    Specimen:  Blood from Blood         Final Active Diagnoses:    Diagnosis Date Noted POA    PRINCIPAL PROBLEM:  Chest pain, unspecified [R07.9] 04/18/2017 Yes    COPD (chronic obstructive pulmonary disease) [J44.9] 04/19/2017 Yes    BPH (benign prostatic hyperplasia) [N40.0] 01/23/2015 Yes      Problems Resolved During this Admission:    Diagnosis Date Noted Date Resolved POA      Discharged Condition: stable    Disposition: Home or Self Care    Follow Up:  Follow-up Information     Follow up with Cande Robbins MD In 3 days.    Specialty:  Internal Medicine    Why:  hospital follow up     Contact information:    [address]  [place] [address] [phone]          Follow up with Wayne Cardenas MD In 2 weeks.    Specialties:  Cardiology, Cardiovascular Disease    Why:  hospital follow up     Contact information:    [address] [facility] [address] [phone]          Patient Instructions:     Diet general   Order Specific Question Answer Comments   Na restriction, if any: 2gNa    Fluid restriction: Fluid -  1500mL      Activity as tolerated     Call MD for:  temperature >100.4     Call MD for:  persistent nausea and vomiting or diarrhea     Call MD for:  severe persistent headache     Call MD for:  difficulty breathing or increased cough     Call MD for:  persistent dizziness, light-headedness, or visual disturbances     Call MD for:  increased confusion or weakness       Medications:  Reconciled Home Medications:   Discharge Medication List as of 4/19/2017  4:08 PM      START taking these medications    Details   cyclobenzaprine (FLEXERIL) 5 MG tablet Take 1 tablet (5 mg total) by mouth 3 (three) times daily as needed for Muscle spasms., Starting 4/19/2017, Until Sat 4/29/17, Print      meloxicam (MOBIC) 7.5 MG tablet Take 1 tablet (7.5 mg total) by mouth once daily., Starting 4/19/2017, Until Fri 5/19/17, Print         CONTINUE these medications which have CHANGED    Details   aspirin (ECOTRIN) 81 MG EC tablet Take 1 tablet (81 mg total) by mouth once daily., Starting 4/19/2017, Until Thu 4/19/18, OTC         CONTINUE these medications which have NOT CHANGED    Details   albuterol (PROAIR HFA) 90 mcg/actuation inhaler Inhale 2 puffs into the lungs every 4 to 6 hours as needed for Wheezing., Until Discontinued, Historical Med      calcium-vitamin D (OSCAL) 250 (625)-125 mg-unit per tablet Take 2 tablets by mouth 2 (two) times daily. , Until Discontinued, Historical Med      clotrimazole (LOTRIMIN) 1 % cream Apply to affected area 2 times daily, Print      cyproheptadine (PERIACTIN) 4 mg tablet Take 4 mg by mouth 2 (two) times daily., Until Discontinued, Historical Med      docusate sodium (COLACE) 100 MG capsule Take 1 capsule (100 mg total) by mouth 2 (two) times daily as needed for Constipation (use while taking pain meds to prevent constipation)., Starting 10/23/2015, Until Discontinued, Print      finasteride (PROSCAR) 5 mg tablet Take 5 mg by mouth once daily., Until Discontinued, Historical Med      fluoxetine  (PROZAC) 20 MG capsule Take 40 mg by mouth once daily., Until Discontinued, Historical Med      furosemide (LASIX) 40 MG tablet Take 1 tablet (40 mg total) by mouth once daily., Starting 10/25/2016, Until Wed 10/25/17, Print      hydrocodone-acetaminophen 7.5-325mg (NORCO) 7.5-325 mg per tablet Take 1 tablet by mouth every 6 (six) hours as needed for Pain., Starting 11/28/2016, Until Discontinued, Print      lidocaine (LIDODERM) 5 % Place 1 patch onto the skin every 12 (twelve) hours. Remove & Discard patch within 12 hours or as directed by MD, Until Discontinued, Historical Med      methylPREDNISolone (MEDROL DOSEPACK) 4 mg tablet Take as labeled, Print      mometasone 220 mcg (30 doses) inhaler Inhale 1 puff into the lungs 2 (two) times daily., Until Discontinued, Historical Med      multivitamin with minerals tablet Take 1 tablet by mouth once daily., Until Discontinued, Historical Med      tiotropium (SPIRIVA) 18 mcg inhalation capsule Inhale 18 mcg into the lungs once daily., Until Discontinued, Historical Med         STOP taking these medications       ibuprofen (ADVIL,MOTRIN) 600 MG tablet Comments:   Reason for Stopping:             Time spent on the discharge of patient: 35 minutes    Zainab Lake NP  Department of Hospital Medicine  [facility]

## 2017-04-19 NOTE — PLAN OF CARE
Problem: Patient Care Overview  Goal: Plan of Care Review  Outcome: Ongoing (interventions implemented as appropriate)  Free of falls/injury during shift  Pain managed effectively w/ PRN meds  Heparin drip infusing w/ daily APTT  NPO  Cards consulted  NSR on telemetry  Safety interventions in place

## 2017-04-19 NOTE — CONSULTS
[facility]   Cardiology  Consult Note    Patient Name: Giovanni Mcintosh  MRN: [id number]  Admission Date: 4/18/2017  Hospital Length of Stay: 0 days  Code Status: Full Code   Attending Provider: Vonnie Sanchez MD   Consulting Provider: Shawnee Cardenas MD  Primary Care Physician: Cande Robbins MD  Principal Problem:Chest pain, unspecified    Patient information was obtained from patient and ER records.     Inpatient consult to Cardiology  Consult performed by: SHAWNEE CARDENAS  Consult ordered by: VONNIE SANCHEZ        Subjective:     Chief Complaint:  Right chest pain.     HPI: 66 yo male, PMH CAD s/p cabg at [facility] yrs ago, CHF, COPD, HTN and HLD. [facility]  He states that 4 days ago, he started right side chest pain, constant and radiating up to neck. Turning head made the pain worse. Not facilitate by exercise. Slightly worsening of dyspnea, no orthopnea, palpitation, dizziness.  In ER, slight troponin elevation, BNP up to 1500.   EKG NSR and LVH with 2nd stt change.    Past Medical History:   Diagnosis Date    Benign prostatic hypertrophy     CHF (congestive heart failure)     COPD (chronic obstructive pulmonary disease)     Coronary artery disease     Depression     Hyperlipidemia     Hypertension        Past Surgical History:   Procedure Laterality Date    CARDIAC SURGERY      carotid artery surgery         Review of patient's allergies indicates:  No Known Allergies    No current facility-administered medications on file prior to encounter.      Current Outpatient Prescriptions on File Prior to Encounter   Medication Sig    albuterol (PROAIR HFA) 90 mcg/actuation inhaler Inhale 2 puffs into the lungs every 4 to 6 hours as needed for Wheezing.    aspirin (ECOTRIN) 325 MG EC tablet Take 325 mg by mouth once daily.    calcium-vitamin D (OSCAL) 250 (625)-125 mg-unit per tablet Take 2 tablets by mouth 2 (two) times daily.     clotrimazole (LOTRIMIN) 1 % cream Apply to affected area 2 times  daily    cyproheptadine (PERIACTIN) 4 mg tablet Take 4 mg by mouth 2 (two) times daily.    docusate sodium (COLACE) 100 MG capsule Take 1 capsule (100 mg total) by mouth 2 (two) times daily as needed for Constipation (use while taking pain meds to prevent constipation).    finasteride (PROSCAR) 5 mg tablet Take 5 mg by mouth once daily.    fluoxetine (PROZAC) 20 MG capsule Take 40 mg by mouth once daily.    furosemide (LASIX) 40 MG tablet Take 1 tablet (40 mg total) by mouth once daily.    hydrocodone-acetaminophen 7.5-325mg (NORCO) 7.5-325 mg per tablet Take 1 tablet by mouth every 6 (six) hours as needed for Pain.    ibuprofen (ADVIL,MOTRIN) 600 MG tablet Take 1 tablet (600 mg total) by mouth every 6 (six) hours as needed for Pain.    lidocaine (LIDODERM) 5 % Place 1 patch onto the skin every 12 (twelve) hours. Remove & Discard patch within 12 hours or as directed by MD    methylPREDNISolone (MEDROL DOSEPACK) 4 mg tablet Take as labeled    mometasone 220 mcg (30 doses) inhaler Inhale 1 puff into the lungs 2 (two) times daily.    multivitamin with minerals tablet Take 1 tablet by mouth once daily.    tiotropium (SPIRIVA) 18 mcg inhalation capsule Inhale 18 mcg into the lungs once daily.     Family History     Problem Relation (Age of Onset)    Heart disease Father        Social History Main Topics    Smoking status: Former Smoker    Smokeless tobacco: Never Used    Alcohol use No    Drug use: No    Sexual activity: Not on file     Review of Systems   Constitution: Negative for decreased appetite, diaphoresis, fever, weakness, malaise/fatigue and night sweats.   HENT: Negative for headaches and nosebleeds.    Eyes: Negative for blurred vision and double vision.   Cardiovascular: Positive for chest pain. Negative for claudication, dyspnea on exertion, irregular heartbeat, leg swelling, near-syncope, orthopnea, palpitations, paroxysmal nocturnal dyspnea and syncope.   Respiratory: Negative for cough,  shortness of breath, sleep disturbances due to breathing, snoring, sputum production and wheezing.    Endocrine: Negative for cold intolerance and polyuria.   Hematologic/Lymphatic: Does not bruise/bleed easily.   Skin: Negative for rash.   Musculoskeletal: Negative for back pain, falls, joint pain, joint swelling and neck pain.   Gastrointestinal: Negative for abdominal pain, heartburn, nausea and vomiting.   Genitourinary: Negative for dysuria, frequency and hematuria.   Neurological: Negative for difficulty with concentration, dizziness, focal weakness, light-headedness, numbness and seizures.   Psychiatric/Behavioral: Negative for depression, memory loss and substance abuse. The patient does not have insomnia.    Allergic/Immunologic: Negative for HIV exposure and hives.     Objective:     Vital Signs (Most Recent):  Temp: 98.3 °F (36.8 °C) (04/19/17 1153)  Pulse: 81 (04/19/17 1153)  Resp: 20 (04/19/17 1153)  BP: (!) 157/77 (04/19/17 1153)  SpO2: 97 % (04/19/17 1153) Vital Signs (24h Range):  Temp:  [98.1 °F (36.7 °C)-99 °F (37.2 °C)] 98.3 °F (36.8 °C)  Pulse:  [74-83] 81  Resp:  [14-23] 20  SpO2:  [96 %-100 %] 97 %  BP: (134-175)/(67-79) 157/77     Weight: 73.4 kg (161 lb 13.1 oz)  Body mass index is 22.57 kg/(m^2).    SpO2: 97 %  O2 Device (Oxygen Therapy): room air      Intake/Output Summary (Last 24 hours) at 04/19/17 1342  Last data filed at 04/19/17 0900   Gross per 24 hour   Intake              300 ml   Output                0 ml   Net              300 ml       Lines/Drains/Airways     Peripheral Intravenous Line                 Peripheral IV - Single Lumen 04/18/17 1408 Left Hand less than 1 day         Peripheral IV - Single Lumen 04/18/17 1819 Right Hand less than 1 day                Physical Exam   Constitutional: He is oriented to person, place, and time. He appears well-nourished.   HENT:   Head: Normocephalic.   Eyes: Pupils are equal, round, and reactive to light.   Neck: Normal carotid pulses  and no JVD present. Carotid bruit is not present. No thyromegaly present.   Cardiovascular: Normal rate, regular rhythm, normal heart sounds and normal pulses.   No extrasystoles are present. PMI is not displaced.  Exam reveals no gallop and no S3.    No murmur heard.  Pulmonary/Chest: Breath sounds normal. No stridor. No respiratory distress.   Abdominal: Soft. Bowel sounds are normal. There is no tenderness. There is no rebound.   Musculoskeletal: Normal range of motion.   Neurological: He is alert and oriented to person, place, and time.   Skin: Skin is intact. No rash noted.   Psychiatric: His behavior is normal.       Significant Labs:   ABG: No results for input(s): PH, PCO2, HCO3, POCSATURATED, BE in the last 48 hours., Blood Culture: No results for input(s): LABBLOO in the last 48 hours., BMP:   Recent Labs  Lab 04/18/17  1446 04/19/17  0539   * 91    141   K 3.4* 4.0    103   CO2 27 27   BUN 16 20   CREATININE 0.9 0.9   CALCIUM 8.7 8.9   MG  --  2.2   , CMP   Recent Labs  Lab 04/18/17  1446 04/19/17  0539    141   K 3.4* 4.0    103   CO2 27 27   * 91   BUN 16 20   CREATININE 0.9 0.9   CALCIUM 8.7 8.9   PROT 6.7 6.8   ALBUMIN 3.2* 3.2*   BILITOT 0.4 0.3   ALKPHOS 63 65   AST 37 37   ALT 26 24   ANIONGAP 10 11   ESTGFRAFRICA >60.0 >60   EGFRNONAA >60.0 >60   , CBC   Recent Labs  Lab 04/18/17  1408 04/18/17  1820 04/19/17  0539   WBC 3.56* 4.31 3.88*   HGB 11.3* 12.3* 11.3*   HCT 36.6* 39.8* 35.9*    262  262 267   , INR   Recent Labs  Lab 04/18/17  1408 04/18/17  1820   INR 1.0 1.0   , Lipid Panel No results for input(s): CHOL, HDL, LDLCALC, TRIG, CHOLHDL in the last 48 hours. and Troponin   Recent Labs  Lab 04/18/17  1702 04/19/17  0045 04/19/17  0539   TROPONINI 0.036* 0.029* 0.033*       Significant Imaging: X-Ray: CXR: X-Ray Chest 1 View (CXR):   Results for orders placed or performed during the hospital encounter of 04/18/17   X-Ray Chest 1 View    Narrative     One-view chest x-ray    Clinical History: Chest pain    Finding: Comparison was made to a prior examination performed on 2/24/2017. There are multiple sternotomy wires in place. The size of the heart is normal. The lungs are clear. There is no pneumothorax.  The costophrenic angles are sharp. There are healing versus healed fractures in the lateral aspect of the right eighth and ninth ribs.    Impression        1. There are healing versus healed fractures in the lateral aspect of the right eighth and ninth ribs.  2. The lungs are clear.  3. There are multiple sternotomy wires in place.      Electronically signed by: JESS JUÁREZ MD  Date:     04/18/17  Time:    14:26      Assessment and Plan:     Right chest pain, reproducible. Muscular pain.  Elevated troponin due to CHF  chf unknown EF.  CAD s/p CABG    Plan:   D/c heparin gtt  Resume home meds  Add Lasix 40 mg iv daily.   Low sodium diet and fluid restriction.    Active Diagnoses:    Diagnosis Date Noted POA    PRINCIPAL PROBLEM:  Chest pain, unspecified [R07.9] 04/18/2017 Yes    COPD (chronic obstructive pulmonary disease) [J44.9] 04/19/2017 Yes    BPH (benign prostatic hyperplasia) [N40.0] 01/23/2015 Yes      Problems Resolved During this Admission:    Diagnosis Date Noted Date Resolved POA       VTE Risk Mitigation         Ordered     Place sequential compression device  Until discontinued      04/19/17 0041     Medium Risk of VTE  Once      04/18/17 2317     Reason for No Pharmacological VTE Prophylaxis  Once      04/18/17 2317          Thank you for your consult. I will follow-up with patient. Please contact us if you have any additional questions.    Wayne Cardenas MD  Cardiology   [facility]

## 2017-04-19 NOTE — PROGRESS NOTES
Pt AVS reviewed with pt and his wife. Pt and his wife deny having questions at this time. Pt has follow up scheduled with Dr james. PIV removed. Catheter remained intact. Tele monitor removed. Pt wheeled out to waiting car at this time. No acute distress noted to pt

## 2017-04-20 LAB
ESTIMATED AVG GLUCOSE: 134 MG/DL
HBA1C MFR BLD HPLC: 6.3 %